# Patient Record
Sex: FEMALE | Race: WHITE | NOT HISPANIC OR LATINO
[De-identification: names, ages, dates, MRNs, and addresses within clinical notes are randomized per-mention and may not be internally consistent; named-entity substitution may affect disease eponyms.]

---

## 2019-06-20 ENCOUNTER — APPOINTMENT (OUTPATIENT)
Dept: GASTROENTEROLOGY | Facility: CLINIC | Age: 59
End: 2019-06-20
Payer: COMMERCIAL

## 2019-06-20 VITALS
HEIGHT: 67 IN | DIASTOLIC BLOOD PRESSURE: 80 MMHG | HEART RATE: 70 BPM | WEIGHT: 132 LBS | BODY MASS INDEX: 20.72 KG/M2 | OXYGEN SATURATION: 98 % | SYSTOLIC BLOOD PRESSURE: 138 MMHG

## 2019-06-20 DIAGNOSIS — Z78.9 OTHER SPECIFIED HEALTH STATUS: ICD-10-CM

## 2019-06-20 PROCEDURE — 99204 OFFICE O/P NEW MOD 45 MIN: CPT

## 2019-06-20 RX ORDER — TOPIRAMATE 50 MG/1
50 CAPSULE, EXTENDED RELEASE ORAL
Refills: 0 | Status: ACTIVE | COMMUNITY

## 2019-06-20 RX ORDER — DICLOFENAC POTASSIUM 50 MG/1
50 POWDER, FOR SOLUTION ORAL
Refills: 0 | Status: ACTIVE | COMMUNITY

## 2019-06-20 RX ORDER — SUMATRIPTAN 100 MG/1
100 TABLET, FILM COATED ORAL
Refills: 0 | Status: ACTIVE | COMMUNITY

## 2019-06-20 RX ORDER — FREMANEZUMAB-VFRM 225 MG/1.5ML
225 INJECTION SUBCUTANEOUS
Refills: 0 | Status: ACTIVE | COMMUNITY

## 2019-06-20 RX ORDER — METAXALONE 800 MG/1
800 TABLET ORAL
Refills: 0 | Status: ACTIVE | COMMUNITY

## 2019-06-20 NOTE — HISTORY OF PRESENT ILLNESS
[FreeTextEntry1] : 58 yo f migraine HA, Cspine pain, noncancerous breast cysts, s/p tubal ligation, s/p tummy tuck, left hand surgery, bilateral breast bx, h/o recurrent pancreatitis 2009, 2010,  s/p ERCP in [place] 2010, told she had a precancerous that was "removed" during ERCP and told to have continued surveillance with EGD. \par no further episodes of pancreatitis since ERCP in 2010 \par reflux started 2010, was previously on PPI, helpful with symptoms, she has tried to control GERD with diet but it is not helpful. GERD continues to come and go, 2-3 days per week. She is now taking OTC ranitidine, 70 % helpful. symptoms include upper abdominal pain, fullness/bloating, chest discomfort, \par she avoids lactose\par \par since she started Ajovy 2 mos ago, she notes more constipation, she has bm every other day with more difficulty. uses senna tea PRN which is helpful. \par \par no brbpr/melena\par no diarrhea\par weight is stable. \par she has 9-10 year history of intermittent lower abdominal pain, chronic and unchanged for yeas, responds to Bentyl. \par \par colonoscopy at age 49 in 2009- no polyps\par \par last colonoscopy 2014- no polyps\par \par previous GI Dr. Walton\par no records available. \par \par fam hx negative for colon polyps, colon cancer. uncle liver ca

## 2019-06-20 NOTE — ASSESSMENT
[FreeTextEntry1] : obtain previous GI records, will consider abdominal imaging after record review\par Recommend EGD to evaluate chronic GERD and patient reportedly due for colonoscopy. \par Risks (including but not limited to sedation risks, infection, bleeding, perforation, possibility of missed lesions), benefits and alternatives to procedure, including not doing the procedure, were discussed with patient. Patient understood and agreed to proceed with EGD/colonoscopy. \par EGD/Colonoscopy preparation instructions reviewed with patient.\par

## 2019-06-20 NOTE — PHYSICAL EXAM
[General Appearance - Alert] : alert [General Appearance - In No Acute Distress] : in no acute distress [Sclera] : the sclera and conjunctiva were normal [Outer Ear] : the ears and nose were normal in appearance [Neck Appearance] : the appearance of the neck was normal [] : no respiratory distress [Apical Impulse] : the apical impulse was normal [Skin Color & Pigmentation] : normal skin color and pigmentation [No Focal Deficits] : no focal deficits [Oriented To Time, Place, And Person] : oriented to person, place, and time [Abdomen Soft] : soft [Abdomen Tenderness] : non-tender [Abnormal Walk] : normal gait [FreeTextEntry1] : deferred to upcoming colonoscopy

## 2019-06-21 ENCOUNTER — TRANSCRIPTION ENCOUNTER (OUTPATIENT)
Age: 59
End: 2019-06-21

## 2019-08-12 ENCOUNTER — RESULT REVIEW (OUTPATIENT)
Age: 59
End: 2019-08-12

## 2019-08-13 ENCOUNTER — RESULT REVIEW (OUTPATIENT)
Age: 59
End: 2019-08-13

## 2019-08-13 ENCOUNTER — APPOINTMENT (OUTPATIENT)
Dept: GASTROENTEROLOGY | Facility: HOSPITAL | Age: 59
End: 2019-08-13

## 2019-08-13 ENCOUNTER — RX RENEWAL (OUTPATIENT)
Age: 59
End: 2019-08-13

## 2019-08-13 DIAGNOSIS — B37.81 CANDIDAL ESOPHAGITIS: ICD-10-CM

## 2020-02-18 ENCOUNTER — APPOINTMENT (OUTPATIENT)
Dept: GASTROENTEROLOGY | Facility: HOSPITAL | Age: 60
End: 2020-02-18

## 2020-07-14 ENCOUNTER — APPOINTMENT (OUTPATIENT)
Dept: GASTROENTEROLOGY | Facility: CLINIC | Age: 60
End: 2020-07-14
Payer: COMMERCIAL

## 2020-07-14 VITALS
SYSTOLIC BLOOD PRESSURE: 120 MMHG | TEMPERATURE: 98.8 F | HEIGHT: 67 IN | HEART RATE: 72 BPM | WEIGHT: 131 LBS | BODY MASS INDEX: 20.56 KG/M2 | DIASTOLIC BLOOD PRESSURE: 74 MMHG

## 2020-07-14 PROCEDURE — 99213 OFFICE O/P EST LOW 20 MIN: CPT

## 2020-07-14 RX ORDER — DICYCLOMINE HYDROCHLORIDE 10 MG/1
10 CAPSULE ORAL
Refills: 0 | Status: DISCONTINUED | COMMUNITY
End: 2020-07-14

## 2020-07-14 RX ORDER — FLUCONAZOLE 200 MG/1
200 TABLET ORAL
Qty: 15 | Refills: 0 | Status: DISCONTINUED | COMMUNITY
Start: 2019-08-13 | End: 2020-07-14

## 2020-07-14 RX ORDER — DULOXETINE HYDROCHLORIDE 20 MG/1
20 CAPSULE, DELAYED RELEASE ORAL
Refills: 0 | Status: DISCONTINUED | COMMUNITY
End: 2020-07-14

## 2020-07-14 RX ORDER — POLYETHYLENE GLYCOL 3350 AND ELECTROLYTES WITH LEMON FLAVOR 236; 22.74; 6.74; 5.86; 2.97 G/4L; G/4L; G/4L; G/4L; G/4L
236 POWDER, FOR SOLUTION ORAL
Qty: 1 | Refills: 0 | Status: DISCONTINUED | COMMUNITY
Start: 2020-02-13 | End: 2020-07-14

## 2020-07-14 NOTE — HISTORY OF PRESENT ILLNESS
[FreeTextEntry1] : Ms. Key is a pleasant 60F h/o migraines, pancreatic lesion s/p resection, pancreatitis s/p multiple ERCPs [id number], tubal ligation, tummy ofelia, L hand surgery who follows up for her GERD.\par \par She was previously seen by Dr. Law, had an EGD 8/13/19 showing candidal esophagitis, reflux esophagitis.  She was treated with fluconazole and omeprazole at that time.\par \par She states she has had significant reflux since her bouts with pancreatitis approximately 10 years ago, since that time she has been sensitive to many foods, mainly experiencing heartburn.  She had a recurrence of her heartburn within the past few months. During that time was also c/o a hoarse voice, sore throat.\par \par She recently saw Dr. Genao of [facility], had a TNE showing evidence of reflux.  She was started on prilosec 20mg daily with resolution of her symptoms.\par \par Denies dysphagia, odynophagia, N/V, rectal bleeding, black stool. She has lost weight due to decreased caloric intake.\par \par Eats a healthy diet, does occasionally eat dinner later than she should.\par \par Is concerned regarding long term effects of PPI medications.\par \par Of note, had a colonoscopy with Dr. Law 2/19/20 showing hemorrhoids, diverticulosis.

## 2020-07-14 NOTE — ASSESSMENT
[FreeTextEntry1] : No alarm signs or symptoms to necessitate a repeat EGD at this time.\par \par Discussed at length the risks of PPI medications. Discussed a recent double blinded randomized controlled trial published within the past year showing a favorable safety profile of PPI medications, with the one significant risk in median 3 year follow up being increased risk of GI infections.\par \par Discussed the option of referring for anti-reflux surgery, which she would like to avoid.\par \par Will continue with her omeprazole 20mg daily for the time being, will likely continue on this medication long term.  She may opt to try to ween off and transition to an H2 blocker at some point.

## 2020-07-14 NOTE — PHYSICAL EXAM
[General Appearance - Alert] : alert [General Appearance - In No Acute Distress] : in no acute distress [Sclera] : the sclera and conjunctiva were normal [Neck Appearance] : the appearance of the neck was normal [Outer Ear] : the ears and nose were normal in appearance [Abnormal Walk] : normal gait [] : no respiratory distress [Cranial Nerves] : cranial nerves 2-12 were intact [Skin Color & Pigmentation] : normal skin color and pigmentation [Oriented To Time, Place, And Person] : oriented to person, place, and time

## 2020-09-16 ENCOUNTER — TRANSCRIPTION ENCOUNTER (OUTPATIENT)
Age: 60
End: 2020-09-16

## 2021-02-09 ENCOUNTER — NON-APPOINTMENT (OUTPATIENT)
Age: 61
End: 2021-02-09

## 2021-02-09 ENCOUNTER — APPOINTMENT (OUTPATIENT)
Dept: INTERNAL MEDICINE | Facility: CLINIC | Age: 61
End: 2021-02-09
Payer: COMMERCIAL

## 2021-02-09 VITALS
HEART RATE: 72 BPM | OXYGEN SATURATION: 99 % | SYSTOLIC BLOOD PRESSURE: 130 MMHG | HEIGHT: 67 IN | WEIGHT: 131 LBS | DIASTOLIC BLOOD PRESSURE: 70 MMHG | BODY MASS INDEX: 20.56 KG/M2

## 2021-02-09 DIAGNOSIS — Z86.69 PERSONAL HISTORY OF OTHER DISEASES OF THE NERVOUS SYSTEM AND SENSE ORGANS: ICD-10-CM

## 2021-02-09 PROCEDURE — 36415 COLL VENOUS BLD VENIPUNCTURE: CPT

## 2021-02-09 PROCEDURE — 93000 ELECTROCARDIOGRAM COMPLETE: CPT | Mod: 59

## 2021-02-09 PROCEDURE — 99386 PREV VISIT NEW AGE 40-64: CPT | Mod: 25

## 2021-02-09 PROCEDURE — 99072 ADDL SUPL MATRL&STAF TM PHE: CPT

## 2021-02-09 PROCEDURE — G0442 ANNUAL ALCOHOL SCREEN 15 MIN: CPT

## 2021-02-09 PROCEDURE — G0444 DEPRESSION SCREEN ANNUAL: CPT

## 2021-02-19 LAB
25(OH)D3 SERPL-MCNC: 47.2 NG/ML
ALBUMIN MFR SERPL ELPH: 60.7 %
ALBUMIN SERPL ELPH-MCNC: 3.4 G/DL
ALBUMIN SERPL-MCNC: 3.1 G/DL
ALBUMIN/GLOB SERPL: 1.6 RATIO
ALP BLD-CCNC: 54 U/L
ALPHA1 GLOB MFR SERPL ELPH: 6.2 %
ALPHA1 GLOB SERPL ELPH-MCNC: 0.3 G/DL
ALPHA2 GLOB MFR SERPL ELPH: 13.7 %
ALPHA2 GLOB SERPL ELPH-MCNC: 0.7 G/DL
ALT SERPL-CCNC: 21 U/L
ANION GAP SERPL CALC-SCNC: 12 MMOL/L
AST SERPL-CCNC: 32 U/L
B-GLOBULIN MFR SERPL ELPH: 12.6 %
B-GLOBULIN SERPL ELPH-MCNC: 0.6 G/DL
BASOPHILS # BLD AUTO: 0.03 K/UL
BASOPHILS NFR BLD AUTO: 0.6 %
BILIRUB SERPL-MCNC: 0.4 MG/DL
BUN SERPL-MCNC: 15 MG/DL
CALCIUM SERPL-MCNC: 9.6 MG/DL
CHLORIDE SERPL-SCNC: 109 MMOL/L
CHOLEST SERPL-MCNC: 180 MG/DL
CO2 SERPL-SCNC: 22 MMOL/L
CREAT SERPL-MCNC: 0.98 MG/DL
DEPRECATED KAPPA LC FREE/LAMBDA SER: 1.05 RATIO
EOSINOPHIL # BLD AUTO: 0.09 K/UL
EOSINOPHIL NFR BLD AUTO: 1.9 %
ESTIMATED AVERAGE GLUCOSE: 103 MG/DL
GAMMA GLOB FLD ELPH-MCNC: 0.3 G/DL
GAMMA GLOB MFR SERPL ELPH: 6.8 %
GLUCOSE SERPL-MCNC: 85 MG/DL
HBA1C MFR BLD HPLC: 5.2 %
HCT VFR BLD CALC: 51.4 %
HDLC SERPL-MCNC: 74 MG/DL
HGB BLD-MCNC: 15.6 G/DL
IGA SER QL IEP: 43 MG/DL
IGG SER QL IEP: 289 MG/DL
IGM SER QL IEP: 73 MG/DL
IMM GRANULOCYTES NFR BLD AUTO: 0.4 %
INTERPRETATION SERPL IEP-IMP: NORMAL
KAPPA LC CSF-MCNC: 1.46 MG/DL
KAPPA LC SERPL-MCNC: 1.54 MG/DL
LDLC SERPL CALC-MCNC: 96 MG/DL
LYMPHOCYTES # BLD AUTO: 0.76 K/UL
LYMPHOCYTES NFR BLD AUTO: 15.9 %
M PROTEIN SPEC IFE-MCNC: NORMAL
MAN DIFF?: NORMAL
MCHC RBC-ENTMCNC: 30.4 GM/DL
MCHC RBC-ENTMCNC: 30.8 PG
MCV RBC AUTO: 101.6 FL
MONOCYTES # BLD AUTO: 0.29 K/UL
MONOCYTES NFR BLD AUTO: 6.1 %
NEUTROPHILS # BLD AUTO: 3.58 K/UL
NEUTROPHILS NFR BLD AUTO: 75.1 %
NONHDLC SERPL-MCNC: 106 MG/DL
PLATELET # BLD AUTO: 310 K/UL
POTASSIUM SERPL-SCNC: 4.5 MMOL/L
PROT SERPL-MCNC: 5.1 G/DL
RBC # BLD: 5.06 M/UL
RBC # FLD: 14.1 %
SARS-COV-2 IGG SERPL IA-ACNC: 8.84 INDEX
SARS-COV-2 IGG SERPL QL IA: POSITIVE
SODIUM SERPL-SCNC: 142 MMOL/L
T4 SERPL-MCNC: 6.8 UG/DL
TRIGL SERPL-MCNC: 49 MG/DL
TSH SERPL-ACNC: 3.98 UIU/ML
VIT B12 SERPL-MCNC: 261 PG/ML
WBC # FLD AUTO: 4.77 K/UL

## 2021-03-09 ENCOUNTER — RESULT REVIEW (OUTPATIENT)
Age: 61
End: 2021-03-09

## 2021-03-09 ENCOUNTER — APPOINTMENT (OUTPATIENT)
Dept: HEMATOLOGY ONCOLOGY | Facility: CLINIC | Age: 61
End: 2021-03-09
Payer: COMMERCIAL

## 2021-03-09 VITALS
OXYGEN SATURATION: 97 % | WEIGHT: 128 LBS | HEART RATE: 65 BPM | BODY MASS INDEX: 20.09 KG/M2 | TEMPERATURE: 97.8 F | DIASTOLIC BLOOD PRESSURE: 76 MMHG | RESPIRATION RATE: 20 BRPM | SYSTOLIC BLOOD PRESSURE: 130 MMHG | HEIGHT: 67 IN

## 2021-03-09 VITALS
RESPIRATION RATE: 16 BRPM | HEIGHT: 67 IN | OXYGEN SATURATION: 99 % | DIASTOLIC BLOOD PRESSURE: 70 MMHG | TEMPERATURE: 98.8 F | SYSTOLIC BLOOD PRESSURE: 130 MMHG | BODY MASS INDEX: 20.56 KG/M2 | HEART RATE: 72 BPM | WEIGHT: 131 LBS

## 2021-03-09 PROCEDURE — 99072 ADDL SUPL MATRL&STAF TM PHE: CPT

## 2021-03-09 PROCEDURE — 99205 OFFICE O/P NEW HI 60 MIN: CPT

## 2021-03-09 RX ORDER — DULOXETINE HYDROCHLORIDE 40 MG/1
40 CAPSULE, DELAYED RELEASE PELLETS ORAL
Refills: 0 | Status: ACTIVE | COMMUNITY
Start: 2021-03-09

## 2021-03-09 NOTE — REASON FOR VISIT
[Initial Consultation] : an initial consultation for [FreeTextEntry2] : Hypogammaglobulinemia, erythrocytosis

## 2021-03-09 NOTE — CONSULT LETTER
[Dear  ___] : Dear  [unfilled], [Consult Letter:] : I had the pleasure of evaluating your patient, [unfilled]. [Please see my note below.] : Please see my note below. [Consult Closing:] : Thank you very much for allowing me to participate in the care of this patient.  If you have any questions, please do not hesitate to contact me. [Sincerely,] : Sincerely, [FreeTextEntry3] : Rachael Cardenas DO, FACKARTIK, FACP\par Medical Oncology and Hematology\par [facility]\par

## 2021-03-09 NOTE — HISTORY OF PRESENT ILLNESS
[de-identified] : Ms. Key is a 61 year old female who presents to office for initial consultation of hypogammaglobulinemia (IgG 289, IgA 43) and erythrocytosis (hgb 15.6 hct 51.4%).\par She reports being anemic while she was menstruating.  She was first told she had abnormal blood work about 3 years ago, was tested for hemochromatosis about 1 year ago-reports being negative.\par She denies any illness, fevers.\par \par \par Age of Menarche: 13\par Age of Menopause: 56\par OCP/HRT: OCP x 2 years\par \par \par Smoke: never\par ETOH: rare\par Illicit: never\par \par Family History\par Sister (younger) Breast Ca dx age 58, BRCA negative\par Sister (older) Breast Ca dx late 50's (), BRCA negative\par Maternal Uncle Liver Ca dx 70's\par Maternal Aunt Uterine Ca dx 50's\par Mother Breast and Uterine dx age mid 60's/ late 50's\par Brother Prostate Ca dx mid 50's\par Father Basal Cell?\par Nephew NHL dx early 30's\par \par 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

## 2021-03-09 NOTE — ASSESSMENT
[FreeTextEntry1] : #hypogammaglobulinemia\par Reviewed the diagnosis of hypogammaglobulinemia. \par She is not having any upper respiratory/sinopulmonary infections\par Will recheck LISA and immunoglobulins, UPEP with LISA, LUCIANA, IgG subsets, Flow cytometry\par \par #erythrocytosis\par Although hgb not markedly elevated above 16 will do polycythemia work up as this has been persistent. Will check EPO, JAK2, HFE, iron panel\par \par #macrocytosis likely 2/2 B12 deficiency\par will given b12 1000 mcg daily. If no improvement in B12 will give injections\par Will check IF ab, Anti parietal ab and MMA - to r/o perncious anemia\par \par RTC in 1 week to review bloodwork

## 2021-03-16 ENCOUNTER — APPOINTMENT (OUTPATIENT)
Dept: HEMATOLOGY ONCOLOGY | Facility: CLINIC | Age: 61
End: 2021-03-16
Payer: COMMERCIAL

## 2021-03-16 VITALS
SYSTOLIC BLOOD PRESSURE: 126 MMHG | OXYGEN SATURATION: 98 % | BODY MASS INDEX: 21.19 KG/M2 | HEART RATE: 63 BPM | TEMPERATURE: 97.6 F | RESPIRATION RATE: 18 BRPM | HEIGHT: 67 IN | DIASTOLIC BLOOD PRESSURE: 82 MMHG | WEIGHT: 135 LBS

## 2021-03-16 PROCEDURE — 99215 OFFICE O/P EST HI 40 MIN: CPT

## 2021-03-16 PROCEDURE — 99072 ADDL SUPL MATRL&STAF TM PHE: CPT

## 2021-03-16 NOTE — HISTORY OF PRESENT ILLNESS
[de-identified] : Ms. Key is a 61 year old female who presents to office for initial consultation of hypogammaglobulinemia (IgG 289, IgA 43) and erythrocytosis (hgb 15.6 hct 51.4%).\par She reports being anemic while she was menstruating.  She was first told she had abnormal blood work about 3 years ago, was tested for hemochromatosis about 1 year ago-reports being negative.\par She denies any illness, fevers.\par \par \par Age of Menarche: 13\par Age of Menopause: 56\par OCP/HRT: OCP x 2 years\par \par \par Smoke: never\par ETOH: rare\par Illicit: never\par \par Family History\par Sister (younger) Breast Ca dx age 58, BRCA negative\par Sister (older) Breast Ca dx late 50's (), BRCA negative\par Maternal Uncle Liver Ca dx 70's\par Maternal Aunt Uterine Ca dx 50's\par Mother Breast and Uterine dx age mid 60's/ late 50's\par Brother Prostate Ca dx mid 50's\par Father Basal Cell?\par Nephew NHL dx early 30's\par \par 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

## 2021-03-16 NOTE — ASSESSMENT
[FreeTextEntry1] : #hypogammaglobulinemia\par Reviewed the diagnosis of hypogammaglobulinemia. \par She is not having any upper respiratory/sinopulmonary infections\par LISA - no monoclonal band,  \par immunoglobulins - kappa slightly elevated but no monoclonal band \par UPEP with LISA - no monoclonal band\par IgG subsets - low IgG1 and 2 subsets - asymptomatic. No Frequent infections. Will monitor. If she develops infection will give IVIG\par Flow cytometry - normal\par \par #erythrocytosis\par resolved\par work up negative\par \par #macrocytosis likely 2/2 B12 deficiency\par will given b12 1000 mcg daily. If no improvement in B12 will give injections\par no indications of pernicious anemia\par \par #iron deficiency anemia\par ferritin 31 \par she has constipation. Will give injectafer 750 mg IV x 2 doses 1 week apart to avoid GI side effects. Reviewed side effects\par \par RTC in 2 months with cbc with diff, cmp, immunoglobulins, B12/folate, iron panel and ferritin

## 2021-03-16 NOTE — CONSULT LETTER
[Dear  ___] : Dear  [unfilled], [Consult Letter:] : I had the pleasure of evaluating your patient, [unfilled]. [Please see my note below.] : Please see my note below. [Consult Closing:] : Thank you very much for allowing me to participate in the care of this patient.  If you have any questions, please do not hesitate to contact me. [Sincerely,] : Sincerely, [FreeTextEntry3] : Rachael Cardenas DO, FACKARTIK, FACP\par Medical Oncology and Hematology\par [facility]\par

## 2021-03-18 ENCOUNTER — APPOINTMENT (OUTPATIENT)
Dept: OBGYN | Facility: CLINIC | Age: 61
End: 2021-03-18
Payer: COMMERCIAL

## 2021-03-18 VITALS
HEIGHT: 67 IN | BODY MASS INDEX: 21.19 KG/M2 | TEMPERATURE: 97 F | WEIGHT: 135 LBS | DIASTOLIC BLOOD PRESSURE: 80 MMHG | SYSTOLIC BLOOD PRESSURE: 140 MMHG

## 2021-03-18 DIAGNOSIS — Z80.3 FAMILY HISTORY OF MALIGNANT NEOPLASM OF BREAST: ICD-10-CM

## 2021-03-18 DIAGNOSIS — Z80.0 FAMILY HISTORY OF MALIGNANT NEOPLASM OF DIGESTIVE ORGANS: ICD-10-CM

## 2021-03-18 DIAGNOSIS — K86.2 CYST OF PANCREAS: ICD-10-CM

## 2021-03-18 DIAGNOSIS — Z80.42 FAMILY HISTORY OF MALIGNANT NEOPLASM OF PROSTATE: ICD-10-CM

## 2021-03-18 DIAGNOSIS — Z80.41 FAMILY HISTORY OF MALIGNANT NEOPLASM OF OVARY: ICD-10-CM

## 2021-03-18 DIAGNOSIS — Z01.419 ENCOUNTER FOR GYNECOLOGICAL EXAMINATION (GENERAL) (ROUTINE) W/OUT ABNORMAL FINDINGS: ICD-10-CM

## 2021-03-18 PROCEDURE — 99072 ADDL SUPL MATRL&STAF TM PHE: CPT

## 2021-03-18 PROCEDURE — 99386 PREV VISIT NEW AGE 40-64: CPT

## 2021-03-18 RX ORDER — POLYETHYLENE GLYCOL 3350, SODIUM SULFATE, SODIUM CHLORIDE, POTASSIUM CHLORIDE, ASCORBIC ACID, SODIUM ASCORBATE 7.5-2.691G
100 KIT ORAL
Qty: 1 | Refills: 0 | Status: DISCONTINUED | COMMUNITY
Start: 2019-06-20 | End: 2021-03-18

## 2021-03-18 RX ORDER — TOPIRAMATE 25 MG/1
25 CAPSULE, EXTENDED RELEASE ORAL
Refills: 0 | Status: DISCONTINUED | COMMUNITY
End: 2021-03-18

## 2021-03-18 NOTE — PLAN
[FreeTextEntry1] : -Obtain prior GYN records.\par \par -Referred to [facility] breast surgery.\par \par -Referred for genetic counseling.\par \par -Notify provider for any postmenopausal bleeding.\par \par -F/u one year.

## 2021-03-18 NOTE — REVIEW OF SYSTEMS
[Negative] : Heme/Lymph [Fever] : no fever [Chills] : no chills [Dyspnea] : no dyspnea [Cough] : no cough [Chest Pain] : no chest pain [Palpitations] : no palpitations [Constipation] : no constipation [Diarrhea] : diarrhea [Dysuria] : no dysuria [Pelvic pain] : no pelvic pain [Breast Pain] : no breast pain [Breast Lump] : no breast lump [Nipple Changes] : no nipple changes [Nipple Discharge] : no nipple discharge [Skin Lesion on Breast] : no skin lesion on breast [Skin Rash] : no skin rash [Mole Changes] : no mole changes [Arthralgias] : no arthralgias [Hot Flashes] : no hot flashes

## 2021-03-18 NOTE — HISTORY OF PRESENT ILLNESS
[Menarche Age: ____] : age at menarche was [unfilled] [Currently In Menopause] : currently in menopause [Menopause Age: ____] : age at menopause was [unfilled] [FreeTextEntry1] : 60 y/o G0 presents for annual GYN exam.\par \par -, but not sexually active\par \par -History of breast biopsies. Strong family history of ovarian/breast/endometrial ca\par \par -Was followed by breast specialist. (No longer in insurance network).\par \par -Last breast imaging was Nov 2020, and normal.\par \par -Bone density ordered by primary.\par \par -Last colonoscopy 2/18/20\par \par -Last Pap >3 years ago. 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

## 2021-03-18 NOTE — PHYSICAL EXAM
[Appropriately responsive] : appropriately responsive [Alert] : alert [No Acute Distress] : no acute distress [No Lymphadenopathy] : no lymphadenopathy [Regular Rate Rhythm] : regular rate rhythm [No Murmurs] : no murmurs [Clear to Auscultation B/L] : clear to auscultation bilaterally [Soft] : soft [Non-tender] : non-tender [Oriented x3] : oriented x3 [Examination Of The Breasts] : a normal appearance [No Discharge] : no discharge [No Masses] : no breast masses were palpable [Vulvar Atrophy] : vulvar atrophy [Labia Majora] : normal [Labia Minora] : normal [Atrophy] : atrophy [No Bleeding] : There was no active vaginal bleeding [Normal] : normal [Uterine Adnexae] : normal [Tenderness] : nontender [Enlarged ___ wks] : not enlarged

## 2021-03-22 LAB
CYTOLOGY CVX/VAG DOC THIN PREP: ABNORMAL
HPV HIGH+LOW RISK DNA PNL CVX: NOT DETECTED

## 2021-03-23 ENCOUNTER — RESULT REVIEW (OUTPATIENT)
Age: 61
End: 2021-03-23

## 2021-05-18 ENCOUNTER — APPOINTMENT (OUTPATIENT)
Dept: HEMATOLOGY ONCOLOGY | Facility: CLINIC | Age: 61
End: 2021-05-18
Payer: COMMERCIAL

## 2021-05-18 ENCOUNTER — RESULT REVIEW (OUTPATIENT)
Age: 61
End: 2021-05-18

## 2021-05-18 VITALS
OXYGEN SATURATION: 97 % | DIASTOLIC BLOOD PRESSURE: 91 MMHG | TEMPERATURE: 97.6 F | SYSTOLIC BLOOD PRESSURE: 136 MMHG | RESPIRATION RATE: 16 BRPM | HEART RATE: 74 BPM | WEIGHT: 134 LBS | HEIGHT: 68.4 IN | BODY MASS INDEX: 20.08 KG/M2

## 2021-05-18 PROCEDURE — 99213 OFFICE O/P EST LOW 20 MIN: CPT

## 2021-05-18 PROCEDURE — 99072 ADDL SUPL MATRL&STAF TM PHE: CPT

## 2021-05-18 RX ORDER — ONDANSETRON 4 MG/1
4 TABLET, ORALLY DISINTEGRATING ORAL
Qty: 30 | Refills: 0 | Status: ACTIVE | COMMUNITY
Start: 2021-02-16

## 2021-05-18 RX ORDER — RIMEGEPANT SULFATE 75 MG/75MG
75 TABLET, ORALLY DISINTEGRATING ORAL
Qty: 8 | Refills: 0 | Status: ACTIVE | COMMUNITY
Start: 2021-03-17

## 2021-05-18 NOTE — HISTORY OF PRESENT ILLNESS
[de-identified] : Ms. Key is a 61 year old female who presents to office for initial consultation of hypogammaglobulinemia (IgG 289, IgA 43) and erythrocytosis (hgb 15.6 hct 51.4%).\par She reports being anemic while she was menstruating.  She was first told she had abnormal blood work about 3 years ago, was tested for hemochromatosis about 1 year ago-reports being negative.\par She denies any illness, fevers.\par \par \par Age of Menarche: 13\par Age of Menopause: 56\par OCP/HRT: OCP x 2 years\par \par \par Smoke: never\par ETOH: rare\par Illicit: never\par \par Family History\par Sister (younger) Breast Ca dx age 58, BRCA negative\par Sister (older) Breast Ca dx late 50's (), BRCA negative\par Maternal Uncle Liver Ca dx 70's\par Maternal Aunt Uterine Ca dx 50's\par Mother Breast and Uterine dx age mid 60's/ late 50's\par Brother Prostate Ca dx mid 50's\par Father Basal Cell?\par Nephew NHL dx early 30's\par \par 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 Buchanan in [place].

## 2021-05-18 NOTE — REVIEW OF SYSTEMS
[Constipation] : constipation [Negative] : Allergic/Immunologic [Fatigue] : fatigue [FreeTextEntry7] : chronic

## 2021-05-18 NOTE — ASSESSMENT
[FreeTextEntry1] : #hypogammaglobulinemia\par Reviewed the diagnosis of hypogammaglobulinemia. \par She is not having any upper respiratory/sinopulmonary infections\par LISA - no monoclonal band,  \par immunoglobulins - kappa slightly elevated but no monoclonal band \par UPEP with LISA - no monoclonal band\par IgG subsets - low IgG1 and 2 subsets - asymptomatic. No Frequent infections. Will monitor. If she develops infection will give IVIG\par Flow cytometry - normal\par \par #erythrocytosis\par resolved\par work up negative\par \par #macrocytosis likely 2/2 B12 deficiency\par will given b12 1000 mcg daily. If no improvement in B12 will give injections\par no indications of pernicious anemia\par \par #iron deficiency anemia- ferritin 31 \par she has constipation. Will give injectafer 750 mg IV x 2 doses 1 week apart to avoid GI side effects. Reviewed side effects\par \par Case and management discussed with Dr. Cardenas\par pending repeat blood work\par RTC in 3 months with cbc with diff, cmp, immunoglobulins, B12/folate, iron panel and ferritin

## 2021-06-01 ENCOUNTER — APPOINTMENT (OUTPATIENT)
Dept: ENDOCRINOLOGY | Facility: CLINIC | Age: 61
End: 2021-06-01
Payer: COMMERCIAL

## 2021-06-01 VITALS
BODY MASS INDEX: 20.46 KG/M2 | OXYGEN SATURATION: 97 % | WEIGHT: 135 LBS | RESPIRATION RATE: 16 BRPM | DIASTOLIC BLOOD PRESSURE: 78 MMHG | HEART RATE: 67 BPM | HEIGHT: 68 IN | SYSTOLIC BLOOD PRESSURE: 130 MMHG

## 2021-06-01 PROCEDURE — 36415 COLL VENOUS BLD VENIPUNCTURE: CPT

## 2021-06-01 PROCEDURE — 99204 OFFICE O/P NEW MOD 45 MIN: CPT | Mod: 25

## 2021-06-01 RX ORDER — CHLORHEXIDINE GLUCONATE 4 %
400 (240 MG) LIQUID (ML) TOPICAL
Refills: 0 | Status: DISCONTINUED | COMMUNITY
Start: 2021-03-09 | End: 2021-06-01

## 2021-06-01 NOTE — PHYSICAL EXAM
[No Neck Mass] : no neck mass was observed [de-identified] : mildly enlarged thyroid on exam mobile with swallowing painless

## 2021-06-01 NOTE — REVIEW OF SYSTEMS
[Blurred Vision] : blurred vision [Constipation] : constipation [Abdominal Pain] : abdominal pain [Myalgia] : myalgia  [Muscle Cramps] : muscle cramps [Headaches] : headaches [Cold Intolerance] : cold intolerance [Negative] : Heme/Lymph [FreeTextEntry9] : bone pain [de-identified] : anemia h/o

## 2021-06-01 NOTE — REASON FOR VISIT
[Initial Evaluation] : an initial evaluation [Thyroid nodule/ MNG] : thyroid nodule/ MNG [FreeTextEntry2] : thyroid

## 2021-06-01 NOTE — HISTORY OF PRESENT ILLNESS
[FreeTextEntry1] : Ms. MARIBEL BERGMAN is a 61 year old female sent in a consult by her PCP for newly diagnosed thyroid nodules after having MRI C spine for neck pains with her neurologist \par she brought in a copy of her US reviewed \par \par denies FHx thyroid cancer, mother and sister breast cancer, maternal sister ovarian cancer\par maternal aunt liver cancer\par denies h/o neck irradiation\par denies dysphagia\par denies dyspnea\par denies dysphonia\par \par TSH 3.8 2/2021

## 2021-06-01 NOTE — CONSULT LETTER
[Dear  ___] : Dear  [unfilled], [Consult Letter:] : I had the pleasure of evaluating your patient, [unfilled]. [Please see my note below.] : Please see my note below. [Consult Closing:] : Thank you very much for allowing me to participate in the care of this patient.  If you have any questions, please do not hesitate to contact me. [Sincerely,] : Sincerely, [FreeTextEntry3] : Sincerely,\par \par JANNET BARRETT MD\par [facility]\par [facility]\par

## 2021-06-09 ENCOUNTER — NON-APPOINTMENT (OUTPATIENT)
Age: 61
End: 2021-06-09

## 2021-06-17 ENCOUNTER — NON-APPOINTMENT (OUTPATIENT)
Age: 61
End: 2021-06-17

## 2021-06-17 ENCOUNTER — APPOINTMENT (OUTPATIENT)
Dept: INTERNAL MEDICINE | Facility: CLINIC | Age: 61
End: 2021-06-17
Payer: COMMERCIAL

## 2021-06-17 VITALS
SYSTOLIC BLOOD PRESSURE: 130 MMHG | WEIGHT: 135 LBS | DIASTOLIC BLOOD PRESSURE: 60 MMHG | BODY MASS INDEX: 20.46 KG/M2 | HEART RATE: 72 BPM | HEIGHT: 68 IN | OXYGEN SATURATION: 100 %

## 2021-06-17 PROCEDURE — 99214 OFFICE O/P EST MOD 30 MIN: CPT

## 2021-06-29 ENCOUNTER — APPOINTMENT (OUTPATIENT)
Dept: GASTROENTEROLOGY | Facility: CLINIC | Age: 61
End: 2021-06-29
Payer: COMMERCIAL

## 2021-06-29 ENCOUNTER — NON-APPOINTMENT (OUTPATIENT)
Age: 61
End: 2021-06-29

## 2021-06-29 VITALS
DIASTOLIC BLOOD PRESSURE: 80 MMHG | SYSTOLIC BLOOD PRESSURE: 120 MMHG | WEIGHT: 130 LBS | BODY MASS INDEX: 19.7 KG/M2 | HEART RATE: 78 BPM | TEMPERATURE: 97.8 F | OXYGEN SATURATION: 97 % | HEIGHT: 68 IN

## 2021-06-29 DIAGNOSIS — K21.9 GASTRO-ESOPHAGEAL REFLUX DISEASE W/OUT ESOPHAGITIS: ICD-10-CM

## 2021-06-29 DIAGNOSIS — K52.9 NONINFECTIVE GASTROENTERITIS AND COLITIS, UNSPECIFIED: ICD-10-CM

## 2021-06-29 PROCEDURE — 99214 OFFICE O/P EST MOD 30 MIN: CPT

## 2021-06-29 NOTE — HISTORY OF PRESENT ILLNESS
[FreeTextEntry1] : Ms. Key is a pleasant 61F h/o migraines, pancreatic lesion s/p resection, pancreatitis s/p multiple ERCPs [id number], tubal ligation, tummy tuck, L hand surgery who follows up today.\par \par She was recently seen in the ED on 6/15/21 for LLQ abdominal pain that persistently worsened throughout that day, localized, sharp, severe at times, constant.  Not exacerbated or alleviated by anything she could define.  In the ED on that date:\par WBC 8.8\par H/H 14.0/41.8\par Plat 245\par CMET unremarkable\par CT A/P: Mild mucosal/wall thickening of the small bowel and distal colon with mild periserosal infiltration is most suggestive of enterocolitis\par \par She was prescribed levaquin which she took until 6/17, at which time she changed to flagyl.  Her symptoms significantly improved, however she still has soreness in her LLQ, although no overt pain.  No diarrhea, constipation, rectal bleeding, black stool.  Has had a loss of appetite and mild bloating/gas.  Generally feeling better.\par \par Last colonoscopy 2/20 showing hemorrhoids, diverticulosis.\par \par Of note, reflux symptoms resolved and she has stopped her PPI.

## 2021-06-29 NOTE — ASSESSMENT
[FreeTextEntry1] : Likely a self-limited enterocolitis, now improving.\par \par Continue with expectant management.  Now s/p levaquin and flagyl.\par \par If symptoms recur, or if pain does not resolve within the next several weeks, would then plan on a repeat colonoscopy.

## 2021-08-10 ENCOUNTER — APPOINTMENT (OUTPATIENT)
Dept: SURGERY | Facility: CLINIC | Age: 61
End: 2021-08-10
Payer: COMMERCIAL

## 2021-08-10 ENCOUNTER — LABORATORY RESULT (OUTPATIENT)
Age: 61
End: 2021-08-10

## 2021-08-10 ENCOUNTER — RESULT REVIEW (OUTPATIENT)
Age: 61
End: 2021-08-10

## 2021-08-10 ENCOUNTER — APPOINTMENT (OUTPATIENT)
Dept: HEMATOLOGY ONCOLOGY | Facility: CLINIC | Age: 61
End: 2021-08-10
Payer: COMMERCIAL

## 2021-08-10 VITALS
SYSTOLIC BLOOD PRESSURE: 144 MMHG | DIASTOLIC BLOOD PRESSURE: 89 MMHG | WEIGHT: 130 LBS | HEIGHT: 67 IN | HEART RATE: 65 BPM | BODY MASS INDEX: 20.4 KG/M2

## 2021-08-10 VITALS
TEMPERATURE: 97.5 F | RESPIRATION RATE: 16 BRPM | HEIGHT: 69.6 IN | OXYGEN SATURATION: 98 % | HEART RATE: 70 BPM | DIASTOLIC BLOOD PRESSURE: 78 MMHG | SYSTOLIC BLOOD PRESSURE: 125 MMHG | WEIGHT: 136.6 LBS | BODY MASS INDEX: 19.78 KG/M2

## 2021-08-10 PROCEDURE — 99213 OFFICE O/P EST LOW 20 MIN: CPT

## 2021-08-10 PROCEDURE — 10006 FNA BX W/US GDN EA ADDL: CPT

## 2021-08-10 PROCEDURE — 10005 FNA BX W/US GDN 1ST LES: CPT

## 2021-08-10 PROCEDURE — 99204 OFFICE O/P NEW MOD 45 MIN: CPT

## 2021-08-10 RX ORDER — DOXYCYCLINE 100 MG/1
100 TABLET, FILM COATED ORAL
Qty: 14 | Refills: 0 | Status: COMPLETED | COMMUNITY
Start: 2021-06-17 | End: 2021-08-10

## 2021-08-10 NOTE — HISTORY OF PRESENT ILLNESS
[de-identified] : Ms. Key is a 61 year old female who presents to office for initial consultation of hypogammaglobulinemia (IgG 289, IgA 43) and erythrocytosis (hgb 15.6 hct 51.4%).\par She reports being anemic while she was menstruating.  She was first told she had abnormal blood work about 3 years ago, was tested for hemochromatosis about 1 year ago-reports being negative.\par She denies any illness, fevers.\par \par \par Age of Menarche: 13\par Age of Menopause: 56\par OCP/HRT: OCP x 2 years\par \par \par Smoke: never\par ETOH: rare\par Illicit: never\par \par Family History\par Sister (younger) Breast Ca dx age 58, BRCA negative\par Sister (older) Breast Ca dx late 50's (), BRCA negative\par Maternal Uncle Liver Ca dx 70's\par Maternal Aunt Uterine Ca dx 50's\par Mother Breast and Uterine dx age mid 60's/ late 50's\par Brother Prostate Ca dx mid 50's\par Father Basal Cell?\par Nephew NHL dx early 30's\par \par 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 \par She reports being more busy with daily activities since. \par Denies extreme tiredness, however.\par Reports intermittent constipation. She is being mindful of diet and activity to promote peristalsis.\par Reports ongoing issues with intraocular hypertension - denies glaucoma.

## 2021-08-10 NOTE — REVIEW OF SYSTEMS
[Fatigue] : fatigue [Constipation] : constipation [Negative] : Allergic/Immunologic [FreeTextEntry7] : chronic

## 2021-08-10 NOTE — ASSESSMENT
[FreeTextEntry1] : #hypogammaglobulinemia\par Reviewed the diagnosis of hypogammaglobulinemia\par LISA - no monoclonal band,  \par immunoglobulins - kappa slightly elevated but no monoclonal band \par UPEP with LISA - no monoclonal band\par IgG subsets - low IgG1 and 2 subsets - asymptomatic. No Frequent infections. Will monitor. If she develops infection will give IVIG\par Flow cytometry - normal\par 8/10/2021 - pendiing repeat but work up from may stable. no sinopulmonary infections\par \par \par #macrocytosis likely 2/2 B12 deficiency - improved\par \par #iron deficiency anemia- ferritin 31 \par she has constipation. s/p injectafer 750 mg IV x 2 doses \par ferritin recheck was 410\par pending iron and ferritin today although doubt she will need further supplementation as she denies bleeding\par \par \par RTC in 6 months with cbc with diff, cmp, immunoglobulins, B12/folate, iron panel and ferritin

## 2021-08-11 NOTE — CONSULT LETTER
[Dear  ___] : Dear  [unfilled], [Consult Letter:] : I had the pleasure of evaluating your patient, [unfilled]. [Please see my note below.] : Please see my note below. [Consult Closing:] : Thank you very much for allowing me to participate in the care of this patient.  If you have any questions, please do not hesitate to contact me. [Sincerely,] : Sincerely, [FreeTextEntry2] : Dr. Dary Dupont, Dr. Rizwana Segura [FreeTextEntry3] : John Smith MD, FACS\par System Director, Endocrine Surgery\par [facility]\par Assistant Clinical Professor of Surgery\par [facility] [employer] [facility]  [DrRodney  ___] : Dr. HERNADEZ

## 2021-08-11 NOTE — PHYSICAL EXAM
[de-identified] : 1.2 cm left thyroid nodule, well circumscribed and mobile [Laryngoscopy Performed] : laryngoscopy was performed, see procedure section for findings [Midline] : located in midline position [Normal] : orientation to person, place, and time: normal [de-identified] : indirect  laryngoscopy shows normal vocal cord mobility bilaterally with no lesions noted

## 2021-08-11 NOTE — HISTORY OF PRESENT ILLNESS
[de-identified] : Pt c/o Thyroid nodules.  denies dysphagia, hoarseness, SOB or RT exposure\par sonogram:  Right 1.2 cm,  8 mm,  1.0 cm, Left 1.0 cm, 8 mm, 1.5 cm, 1.1 cm thyroid nodules\par normal TFTs\par I have reviewed all old and new data and available images.\par

## 2021-08-11 NOTE — REASON FOR VISIT
[Initial Consultation] : an initial consultation for [FreeTextEntry2] : Thyroid nodules [Family Member] : family member

## 2021-08-11 NOTE — ASSESSMENT
[FreeTextEntry1] : sonogram guided fine needle aspiration cytology bilateral thyroid nodules performed. to call next week for results. patient has been given the opportunity to ask questions, and all of the patient's questions have been answered to their satisfaction\par

## 2021-08-17 ENCOUNTER — NON-APPOINTMENT (OUTPATIENT)
Age: 61
End: 2021-08-17

## 2021-09-21 ENCOUNTER — APPOINTMENT (OUTPATIENT)
Dept: ENDOCRINOLOGY | Facility: CLINIC | Age: 61
End: 2021-09-21
Payer: COMMERCIAL

## 2021-09-21 VITALS
BODY MASS INDEX: 20.88 KG/M2 | WEIGHT: 133 LBS | TEMPERATURE: 96.3 F | RESPIRATION RATE: 18 BRPM | DIASTOLIC BLOOD PRESSURE: 76 MMHG | OXYGEN SATURATION: 98 % | HEIGHT: 67 IN | SYSTOLIC BLOOD PRESSURE: 124 MMHG | HEART RATE: 74 BPM

## 2021-09-21 LAB
T4 FREE SERPL-MCNC: 1.1 NG/DL
THYROGLOB AB SERPL-ACNC: <20 IU/ML
THYROPEROXIDASE AB SERPL IA-ACNC: <10 IU/ML
TSH SERPL-ACNC: 4.3 UIU/ML

## 2021-09-21 PROCEDURE — 99215 OFFICE O/P EST HI 40 MIN: CPT | Mod: 25

## 2021-09-21 PROCEDURE — 36415 COLL VENOUS BLD VENIPUNCTURE: CPT

## 2021-09-21 RX ORDER — OMEPRAZOLE 20 MG/1
20 CAPSULE, DELAYED RELEASE ORAL
Qty: 90 | Refills: 1 | Status: DISCONTINUED | COMMUNITY
Start: 2020-07-14 | End: 2021-09-21

## 2021-09-21 RX ORDER — METRONIDAZOLE 500 MG/1
500 TABLET ORAL 3 TIMES DAILY
Qty: 21 | Refills: 0 | Status: DISCONTINUED | COMMUNITY
Start: 2021-06-17 | End: 2021-09-21

## 2021-09-21 RX ORDER — GABAPENTIN 100 MG/1
100 CAPSULE ORAL
Qty: 90 | Refills: 0 | Status: DISCONTINUED | COMMUNITY
Start: 2021-03-03 | End: 2021-09-21

## 2021-09-21 RX ORDER — LEVOFLOXACIN 500 MG/1
500 TABLET, FILM COATED ORAL
Qty: 5 | Refills: 0 | Status: DISCONTINUED | COMMUNITY
Start: 2021-06-15 | End: 2021-09-21

## 2021-09-24 LAB
24R-OH-CALCIDIOL SERPL-MCNC: 40.2 PG/ML
25(OH)D3 SERPL-MCNC: 44.3 NG/ML
CALCIUM SERPL-MCNC: 9.3 MG/DL
MAGNESIUM SERPL-MCNC: 2.2 MG/DL
PARATHYROID HORMONE INTACT: 42 PG/ML
T4 FREE SERPL-MCNC: 1 NG/DL
TSH SERPL-ACNC: 4.45 UIU/ML

## 2021-09-24 NOTE — HISTORY OF PRESENT ILLNESS
[FreeTextEntry1] : Ms. MARIBEL BERGMAN is a 61 year old female sent in a consult by her PCP for newly diagnosed thyroid nodules after having MRI C spine for neck pains with her neurologist \par she brought in a copy of her US reviewed \par \par denies FHx thyroid cancer, mother and sister breast cancer, maternal sister ovarian cancer\par maternal aunt liver cancer\par denies h/o neck irradiation\par denies dysphagia\par denies dyspnea\par denies dysphonia\par \par TSH 3.8 2/2021
no

## 2021-09-24 NOTE — PHYSICAL EXAM
[No Neck Mass] : no neck mass was observed [de-identified] : mildly enlarged thyroid on exam mobile with swallowing painless

## 2021-09-24 NOTE — REVIEW OF SYSTEMS
[Blurred Vision] : blurred vision [Constipation] : constipation [Abdominal Pain] : abdominal pain [Myalgia] : myalgia  [Muscle Cramps] : muscle cramps [Headaches] : headaches [Cold Intolerance] : cold intolerance [Negative] : Heme/Lymph [FreeTextEntry9] : bone pain [de-identified] : anemia h/o

## 2021-09-24 NOTE — PHYSICAL EXAM
[No Neck Mass] : no neck mass was observed [de-identified] : mildly enlarged thyroid on exam mobile with swallowing painless

## 2021-09-24 NOTE — REVIEW OF SYSTEMS
[Blurred Vision] : blurred vision [Constipation] : constipation [Abdominal Pain] : abdominal pain [Myalgia] : myalgia  [Muscle Cramps] : muscle cramps [Headaches] : headaches [Cold Intolerance] : cold intolerance [Negative] : Heme/Lymph [FreeTextEntry9] : bone pain [de-identified] : anemia h/o

## 2021-09-28 LAB
ALBUMIN SERPL ELPH-MCNC: 3.4 G/DL
ALP BLD-CCNC: 57 U/L
ALT SERPL-CCNC: 25 U/L
ANION GAP SERPL CALC-SCNC: 8 MMOL/L
AST SERPL-CCNC: 39 U/L
BILIRUB SERPL-MCNC: 0.1 MG/DL
BUN SERPL-MCNC: 15 MG/DL
CALCIUM SERPL-MCNC: 9.3 MG/DL
CHLORIDE SERPL-SCNC: 109 MMOL/L
CO2 SERPL-SCNC: 26 MMOL/L
CREAT SERPL-MCNC: 0.79 MG/DL
GLUCOSE SERPL-MCNC: 87 MG/DL
POTASSIUM SERPL-SCNC: 4.7 MMOL/L
PROT SERPL-MCNC: 4.9 G/DL
SODIUM SERPL-SCNC: 143 MMOL/L

## 2021-10-11 ENCOUNTER — RESULT REVIEW (OUTPATIENT)
Age: 61
End: 2021-10-11

## 2021-10-11 ENCOUNTER — APPOINTMENT (OUTPATIENT)
Dept: HEMATOLOGY ONCOLOGY | Facility: CLINIC | Age: 61
End: 2021-10-11
Payer: COMMERCIAL

## 2021-10-11 VITALS
SYSTOLIC BLOOD PRESSURE: 123 MMHG | HEIGHT: 67 IN | RESPIRATION RATE: 16 BRPM | HEART RATE: 66 BPM | DIASTOLIC BLOOD PRESSURE: 77 MMHG | BODY MASS INDEX: 21.12 KG/M2 | OXYGEN SATURATION: 98 % | TEMPERATURE: 97.3 F | WEIGHT: 134.6 LBS

## 2021-10-11 PROCEDURE — 99214 OFFICE O/P EST MOD 30 MIN: CPT

## 2021-10-11 NOTE — HISTORY OF PRESENT ILLNESS
[de-identified] : Ms. Key is a 61 year old female who presents to office for initial consultation of hypogammaglobulinemia (IgG 289, IgA 43) and erythrocytosis (hgb 15.6 hct 51.4%).\par She reports being anemic while she was menstruating.  She was first told she had abnormal blood work about 3 years ago, was tested for hemochromatosis about 1 year ago-reports being negative.\par She denies any illness, fevers.\par \par \par Age of Menarche: 13\par Age of Menopause: 56\par OCP/HRT: OCP x 2 years\par \par \par Smoke: never\par ETOH: rare\par Illicit: never\par \par Family History\par Sister (younger) Breast Ca dx age 58, BRCA negative\par Sister (older) Breast Ca dx late 50's (), BRCA negative\par Maternal Uncle Liver Ca dx 70's\par Maternal Aunt Uterine Ca dx 50's\par Mother Breast and Uterine dx age mid 60's/ late 50's\par Brother Prostate Ca dx mid 50's\par Father Basal Cell?\par Nephew NHL dx early 30's\par \par 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

## 2021-10-11 NOTE — ASSESSMENT
[FreeTextEntry1] : #hypogammaglobulinemia\par Reviewed the diagnosis of hypogammaglobulinemia\par LISA - no monoclonal band,  \par immunoglobulins - kappa slightly elevated but no monoclonal band \par UPEP with LISA - no monoclonal band\par IgG subsets - low IgG1 and 2 subsets - asymptomatic. No Frequent infections. Will monitor. If she develops infection will give IVIG\par Flow cytometry - normal\par 8/10/2021 - remains hypogamma. no sinopulmonary infections\par \par #macrocytosis likely 2/2 B12 deficiency - improved\par \par #iron deficiency anemia- \par she has constipation. s/p injectafer 750 mg IV x 2 doses w\par ferritin recheck was 410\par ferritin 236\par iron and ferritin pending\par \par #thyroid nodules with hypothyroidism\par initial biopsy with Dr. Smith inconclusive\par taking synthroid 0.25 mcg.\par recommended biopsy with Dr. Cedeno \par Dr. Dupont monitoring reviewe dnote from 9/21/21\par \par #osteopenia - continue ca++ and vit D\par \par RTC in 6 months with cbc with diff, cmp, immunoglobulins, B12/folate, iron panel and ferritin

## 2021-11-14 ENCOUNTER — HOSPITAL ENCOUNTER (EMERGENCY)
Dept: HOSPITAL 74 - FER | Age: 61
Discharge: HOME | End: 2021-11-14
Payer: COMMERCIAL

## 2021-11-14 VITALS — BODY MASS INDEX: 20.3 KG/M2

## 2021-11-14 VITALS — SYSTOLIC BLOOD PRESSURE: 145 MMHG | TEMPERATURE: 98.6 F | HEART RATE: 56 BPM | DIASTOLIC BLOOD PRESSURE: 88 MMHG

## 2021-11-14 DIAGNOSIS — S32.592A: Primary | ICD-10-CM
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
## 2021-11-22 ENCOUNTER — TRANSCRIPTION ENCOUNTER (OUTPATIENT)
Age: 61
End: 2021-11-22

## 2022-01-13 DIAGNOSIS — N39.0 URINARY TRACT INFECTION, SITE NOT SPECIFIED: ICD-10-CM

## 2022-02-01 ENCOUNTER — APPOINTMENT (OUTPATIENT)
Dept: ENDOCRINOLOGY | Facility: CLINIC | Age: 62
End: 2022-02-01
Payer: COMMERCIAL

## 2022-02-01 VITALS
WEIGHT: 135 LBS | BODY MASS INDEX: 21.19 KG/M2 | HEART RATE: 71 BPM | DIASTOLIC BLOOD PRESSURE: 74 MMHG | HEIGHT: 67 IN | SYSTOLIC BLOOD PRESSURE: 118 MMHG | RESPIRATION RATE: 99 BRPM

## 2022-02-01 LAB
25(OH)D3 SERPL-MCNC: 35.1 NG/ML
ALBUMIN SERPL ELPH-MCNC: 3.4 G/DL
ALP BLD-CCNC: 65 U/L
ALT SERPL-CCNC: 13 U/L
ANION GAP SERPL CALC-SCNC: 7 MMOL/L
AST SERPL-CCNC: 23 U/L
BILIRUB SERPL-MCNC: 0.4 MG/DL
BUN SERPL-MCNC: 16 MG/DL
CALCIUM SERPL-MCNC: 9.1 MG/DL
CALCIUM SERPL-MCNC: 9.1 MG/DL
CHLORIDE SERPL-SCNC: 109 MMOL/L
CO2 SERPL-SCNC: 27 MMOL/L
CREAT SERPL-MCNC: 0.85 MG/DL
FERRITIN SERPL-MCNC: 159 NG/ML
FOLATE SERPL-MCNC: 13.6 NG/ML
GLUCOSE SERPL-MCNC: 62 MG/DL
IRON SATN MFR SERPL: 33 %
IRON SERPL-MCNC: 91 UG/DL
MAGNESIUM SERPL-MCNC: 2 MG/DL
OSTEOCALCIN SERPL-MCNC: 11.2 NG/ML
PARATHYROID HORMONE INTACT: 46 PG/ML
POTASSIUM SERPL-SCNC: 4 MMOL/L
PROT SERPL-MCNC: 4.6 G/DL
SODIUM SERPL-SCNC: 143 MMOL/L
T4 FREE SERPL-MCNC: 1.2 NG/DL
TIBC SERPL-MCNC: 273 UG/DL
TSH SERPL-ACNC: 2.26 UIU/ML
UIBC SERPL-MCNC: 182 UG/DL
VIT B12 SERPL-MCNC: 1046 PG/ML

## 2022-02-01 PROCEDURE — 99215 OFFICE O/P EST HI 40 MIN: CPT

## 2022-02-01 RX ORDER — ZOLEDRONIC ACID 5 MG/100ML
5 INJECTION, SOLUTION INTRAVENOUS
Qty: 1 | Refills: 0 | Status: ACTIVE | COMMUNITY
Start: 2022-02-01

## 2022-02-01 RX ORDER — NITROFURANTOIN (MONOHYDRATE/MACROCRYSTALS) 25; 75 MG/1; MG/1
100 CAPSULE ORAL
Qty: 10 | Refills: 0 | Status: DISCONTINUED | COMMUNITY
Start: 2022-01-13 | End: 2022-02-01

## 2022-02-01 RX ORDER — NAPROXEN 500 MG/1
500 TABLET ORAL
Qty: 20 | Refills: 0 | Status: ACTIVE | COMMUNITY
Start: 2021-11-17

## 2022-02-01 RX ORDER — COLD-HOT PACK
50 EACH MISCELLANEOUS DAILY
Refills: 0 | Status: ACTIVE | COMMUNITY
Start: 2021-03-09

## 2022-02-01 NOTE — HISTORY OF PRESENT ILLNESS
[FreeTextEntry1] : Ms. MARIBEL BERGMAN is a 61 year old female initially  sent in a consult by her PCP for newly diagnosed thyroid nodules after having MRI C spine for neck pains with her neurologist \par she brought in a copy of her US reviewed \par had nondiagnostic FNA 8/21 , repeated US 1/22 stable nodules reviewed\par recent pelvic Fx\par \par denies FHx thyroid cancer, mother and sister breast cancer, maternal sister ovarian cancer\par maternal aunt liver cancer\par denies h/o neck irradiation\par denies dysphagia\par denies dyspnea\par denies dysphonia\par \par TSH 3.8 2/2021

## 2022-02-01 NOTE — PHYSICAL EXAM
[No Neck Mass] : no neck mass was observed [de-identified] : mildly enlarged thyroid on exam mobile with swallowing painless

## 2022-02-01 NOTE — REVIEW OF SYSTEMS
[Blurred Vision] : blurred vision [Constipation] : constipation [Abdominal Pain] : abdominal pain [Myalgia] : myalgia  [Muscle Cramps] : muscle cramps [Headaches] : headaches [Cold Intolerance] : cold intolerance [Negative] : Heme/Lymph [FreeTextEntry9] : bone pain [de-identified] : anemia h/o

## 2022-02-02 RX ORDER — IBUPROFEN 200 MG
600 CAPSULE ORAL
Qty: 180 | Refills: 0 | Status: ACTIVE | COMMUNITY
Start: 2022-01-01

## 2022-02-03 ENCOUNTER — TRANSCRIPTION ENCOUNTER (OUTPATIENT)
Age: 62
End: 2022-02-03

## 2022-02-08 ENCOUNTER — RESULT REVIEW (OUTPATIENT)
Age: 62
End: 2022-02-08

## 2022-02-08 ENCOUNTER — APPOINTMENT (OUTPATIENT)
Dept: HEMATOLOGY ONCOLOGY | Facility: CLINIC | Age: 62
End: 2022-02-08
Payer: COMMERCIAL

## 2022-02-08 VITALS
HEIGHT: 67 IN | OXYGEN SATURATION: 99 % | HEART RATE: 65 BPM | WEIGHT: 135 LBS | TEMPERATURE: 97 F | DIASTOLIC BLOOD PRESSURE: 88 MMHG | BODY MASS INDEX: 21.19 KG/M2 | RESPIRATION RATE: 18 BRPM | SYSTOLIC BLOOD PRESSURE: 136 MMHG

## 2022-02-08 PROCEDURE — 36415 COLL VENOUS BLD VENIPUNCTURE: CPT

## 2022-02-08 PROCEDURE — 99213 OFFICE O/P EST LOW 20 MIN: CPT | Mod: 25

## 2022-02-08 RX ORDER — DEXAMETHASONE 4 MG/1
4 TABLET ORAL
Qty: 9 | Refills: 0 | Status: COMPLETED | COMMUNITY
Start: 2020-12-03 | End: 2022-02-08

## 2022-02-08 NOTE — REVIEW OF SYSTEMS
[Negative] : Gastrointestinal [Fatigue] : no fatigue [Constipation] : no constipation [FreeTextEntry2] : review of systems completed, negative unless otherwise noted above [FreeTextEntry7] : chronic

## 2022-02-08 NOTE — ASSESSMENT
[FreeTextEntry1] : #hypogammaglobulinemia\par Reviewed the diagnosis of hypogammaglobulinemia\par LISA - no monoclonal band,  \par immunoglobulins - kappa slightly elevated but no monoclonal band \par UPEP with LISA - no monoclonal band\par IgG subsets - low IgG1 and 2 subsets - asymptomatic. No Frequent infections. Will monitor. If she develops infection will give IVIG\par Flow cytometry - normal\par 8/10/2021 - remains hypogamma. no sinopulmonary infections\par 2/8/2022 immunoglobulins pending,IgG continues to downtrend\par \par #macrocytosis likely 2/2 B12 deficiency - improved\par \par #iron deficiency anemia- \par she has constipation. s/p injectafer 750 mg IV x 2 doses w\par ferritin recheck was 410\par ferritin 236\par iron and ferritin pending\par \par #thyroid nodules with hypothyroidism\par initial biopsy with Dr. Smith inconclusive\par taking synthroid 0.25 mcg.\par recommended biopsy with Dr. Cedeno \par Dr. Dupont monitoring \par \par #osteopenia - continue ca++ and vit D\par Dr. Dupont recommending reclast\par \par Case and management discussed with Dr. delgadillo\par RTC in 6 months with cbc with diff, cmp, immunoglobulins, B12/folate, iron panel and ferritin

## 2022-02-08 NOTE — HISTORY OF PRESENT ILLNESS
[de-identified] : Ms. Key is a 61 year old female who presents to office for initial consultation of hypogammaglobulinemia (IgG 289, IgA 43) and erythrocytosis (hgb 15.6 hct 51.4%).\par She reports being anemic while she was menstruating.  She was first told she had abnormal blood work about 3 years ago, was tested for hemochromatosis about 1 year ago-reports being negative.\par She denies any illness, fevers.\par \par \par Age of Menarche: 13\par Age of Menopause: 56\par OCP/HRT: OCP x 2 years\par \par \par Smoke: never\par ETOH: rare\par Illicit: never\par \par Family History\par Sister (younger) Breast Ca dx age 58, BRCA negative\par Sister (older) Breast Ca dx late 50's (), BRCA negative\par Maternal Uncle Liver Ca dx 70's\par Maternal Aunt Uterine Ca dx 50's\par Mother Breast and Uterine dx age mid 60's/ late 50's\par Brother Prostate Ca dx mid 50's\par Father Basal Cell?\par Nephew NHL dx early 30's\par \par 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

## 2022-02-10 ENCOUNTER — RX RENEWAL (OUTPATIENT)
Age: 62
End: 2022-02-10

## 2022-02-22 ENCOUNTER — APPOINTMENT (OUTPATIENT)
Dept: SURGERY | Facility: CLINIC | Age: 62
End: 2022-02-22

## 2022-02-25 ENCOUNTER — RX RENEWAL (OUTPATIENT)
Age: 62
End: 2022-02-25

## 2022-02-27 ENCOUNTER — RESULT REVIEW (OUTPATIENT)
Age: 62
End: 2022-02-27

## 2022-02-28 ENCOUNTER — RESULT REVIEW (OUTPATIENT)
Age: 62
End: 2022-02-28

## 2022-03-01 ENCOUNTER — RESULT REVIEW (OUTPATIENT)
Age: 62
End: 2022-03-01

## 2022-03-07 ENCOUNTER — HOSPITAL ENCOUNTER (EMERGENCY)
Dept: HOSPITAL 74 - FER | Age: 62
Discharge: HOME | End: 2022-03-07
Payer: COMMERCIAL

## 2022-03-07 VITALS — DIASTOLIC BLOOD PRESSURE: 88 MMHG | SYSTOLIC BLOOD PRESSURE: 132 MMHG | HEART RATE: 60 BPM | TEMPERATURE: 97.9 F

## 2022-03-07 VITALS — BODY MASS INDEX: 20.7 KG/M2

## 2022-03-07 DIAGNOSIS — M79.671: Primary | ICD-10-CM

## 2022-04-12 ENCOUNTER — APPOINTMENT (OUTPATIENT)
Dept: HEMATOLOGY ONCOLOGY | Facility: CLINIC | Age: 62
End: 2022-04-12
Payer: COMMERCIAL

## 2022-04-12 ENCOUNTER — RESULT REVIEW (OUTPATIENT)
Age: 62
End: 2022-04-12

## 2022-04-12 VITALS
OXYGEN SATURATION: 99 % | BODY MASS INDEX: 21.66 KG/M2 | RESPIRATION RATE: 18 BRPM | HEART RATE: 69 BPM | TEMPERATURE: 98.2 F | WEIGHT: 138 LBS | SYSTOLIC BLOOD PRESSURE: 143 MMHG | DIASTOLIC BLOOD PRESSURE: 90 MMHG | HEIGHT: 66.97 IN

## 2022-04-12 PROCEDURE — 99214 OFFICE O/P EST MOD 30 MIN: CPT | Mod: 25

## 2022-04-12 PROCEDURE — 36415 COLL VENOUS BLD VENIPUNCTURE: CPT

## 2022-04-12 NOTE — REVIEW OF SYSTEMS
[Negative] : Allergic/Immunologic [Joint Pain] : joint pain [Fatigue] : no fatigue [Constipation] : no constipation [FreeTextEntry2] : review of systems completed, negative unless otherwise noted above [FreeTextEntry7] : chronic [FreeTextEntry9] : recent right foot fracture

## 2022-04-12 NOTE — ASSESSMENT
[FreeTextEntry1] : #hypogammaglobulinemia\par Reviewed the diagnosis of hypogammaglobulinemia\par LISA - no monoclonal band,  \par immunoglobulins - kappa slightly elevated but no monoclonal band \par UPEP with LISA - no monoclonal band\par IgG subsets - low IgG1 and 2 subsets - asymptomatic. No Frequent infections. Will monitor. If she develops infection will give IVIG\par Flow cytometry - normal\par 8/10/2021 - remains hypogamma. no sinopulmonary infections\par 2/8/2022 immunoglobulins pending,IgG continues to downtrend\par 4/12/22 - vs and labs reviewed. had a recent flu. wants to hold IVIG. If develops another sinopulmonary infection recommend dose of IVIG. Pending immunoglobulins\par \par #macrocytosis likely 2/2 B12 deficiency - improved\par \par #iron deficiency anemia- \par she has constipation. s/p injectafer 750 mg IV x 2 doses w\par iron wnl and ferritin pending\par \par #thyroid nodules with hypothyroidism\par initial biopsy with Dr. Smith inconclusive\par taking synthroid 0.25 mcg.\par Dr. Dupont monitoring \par biopsy - benign \par \par #osteopenia - continue ca++ and vit D\par reclast given - tolerated well\par \par RTC in 6 months with cbc with diff, cmp, immunoglobulins, B12/folate, iron panel and ferritin

## 2022-04-12 NOTE — HISTORY OF PRESENT ILLNESS
[de-identified] : Ms. Key is a 61 year old female who presents to office for initial consultation of hypogammaglobulinemia (IgG 289, IgA 43) and erythrocytosis (hgb 15.6 hct 51.4%).\par She reports being anemic while she was menstruating.  She was first told she had abnormal blood work about 3 years ago, was tested for hemochromatosis about 1 year ago-reports being negative.\par She denies any illness, fevers.\par \par \par Age of Menarche: 13\par Age of Menopause: 56\par OCP/HRT: OCP x 2 years\par \par \par Smoke: never\par ETOH: rare\par Illicit: never\par \par Family History\par Sister (younger) Breast Ca dx age 58, BRCA negative\par Sister (older) Breast Ca dx late 50's (), BRCA negative\par Maternal Uncle Liver Ca dx 70's\par Maternal Aunt Uterine Ca dx 50's\par Mother Breast and Uterine dx age mid 60's/ late 50's\par Brother Prostate Ca dx mid 50's\par Father Basal Cell?\par Nephew NHL dx early 30's\par \par 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 Her hip is feeling better from the recent hip fracture but she currently has a right foot fracture. \par She denies feeling dizzy, lightheaded, SOB, palpitations, nausea, vomiting, constipation, diarrhea, bleeding, frequent fevers, and illness.\par Patient had thyroid nodules biopsy which were both benign.

## 2022-05-08 ENCOUNTER — LABORATORY RESULT (OUTPATIENT)
Age: 62
End: 2022-05-08

## 2022-05-10 ENCOUNTER — APPOINTMENT (OUTPATIENT)
Dept: ENDOCRINOLOGY | Facility: CLINIC | Age: 62
End: 2022-05-10
Payer: COMMERCIAL

## 2022-05-10 VITALS
HEIGHT: 67 IN | HEART RATE: 63 BPM | SYSTOLIC BLOOD PRESSURE: 110 MMHG | WEIGHT: 136 LBS | OXYGEN SATURATION: 99 % | BODY MASS INDEX: 21.35 KG/M2 | DIASTOLIC BLOOD PRESSURE: 70 MMHG

## 2022-05-10 LAB
24R-OH-CALCIDIOL SERPL-MCNC: 55.7 PG/ML
25(OH)D3 SERPL-MCNC: 35.1 NG/ML
ALBUMIN SERPL ELPH-MCNC: 3.5 G/DL
ALP BLD-CCNC: 54 U/L
ALT SERPL-CCNC: 18 U/L
ANION GAP SERPL CALC-SCNC: 16 MMOL/L
AST SERPL-CCNC: 25 U/L
BILIRUB SERPL-MCNC: 0.3 MG/DL
BUN SERPL-MCNC: 18 MG/DL
CALCIUM SERPL-MCNC: 9.6 MG/DL
CALCIUM SERPL-MCNC: 9.6 MG/DL
CHLORIDE SERPL-SCNC: 111 MMOL/L
CO2 SERPL-SCNC: 20 MMOL/L
COLLAGEN NTX UR-SCNC: 389 NMOL BCE
COLLAGEN NTX/CREAT UR-SRTO: 48
CREAT SERPL-MCNC: 0.88 MG/DL
CREAT UR-MCNC: 91.3 MG/DL
EGFR: 74 ML/MIN/1.73M2
GLUCOSE SERPL-MCNC: 73 MG/DL
INTERPRETIVE GUIDE:: NORMAL
MAGNESIUM SERPL-MCNC: 2.1 MG/DL
PARATHYROID HORMONE INTACT: 40 PG/ML
PHOSPHATE SERPL-MCNC: 3.7 MG/DL
POTASSIUM SERPL-SCNC: 4.7 MMOL/L
PROT SERPL-MCNC: 5.2 G/DL
SODIUM SERPL-SCNC: 147 MMOL/L
T4 FREE SERPL-MCNC: 1 NG/DL
THYROGLOB AB SERPL-ACNC: <20 IU/ML
THYROPEROXIDASE AB SERPL IA-ACNC: <10 IU/ML
TSH SERPL-ACNC: 2.91 UIU/ML

## 2022-05-10 PROCEDURE — 99215 OFFICE O/P EST HI 40 MIN: CPT

## 2022-06-09 LAB
ALBUMIN MFR SERPL ELPH: 64.4 %
ALBUMIN SERPL-MCNC: 3.3 G/DL
ALBUMIN/GLOB SERPL: 1.7 RATIO
ALPHA1 GLOB MFR SERPL ELPH: 4.9 %
ALPHA1 GLOB SERPL ELPH-MCNC: 0.3 G/DL
ALPHA2 GLOB MFR SERPL ELPH: 12.8 %
ALPHA2 GLOB SERPL ELPH-MCNC: 0.7 G/DL
B-GLOBULIN MFR SERPL ELPH: 11.4 %
B-GLOBULIN SERPL ELPH-MCNC: 0.6 G/DL
COLLAGEN CTX SERPL-MCNC: 87 PG/ML
GAMMA GLOB FLD ELPH-MCNC: 0.3 G/DL
GAMMA GLOB MFR SERPL ELPH: 6.5 %
INTERPRETATION SERPL IEP-IMP: NORMAL
PROT SERPL-MCNC: 5.2 G/DL
PROT SERPL-MCNC: 5.2 G/DL

## 2022-06-20 NOTE — REVIEW OF SYSTEMS
[Blurred Vision] : blurred vision [Constipation] : constipation [Abdominal Pain] : abdominal pain [Myalgia] : myalgia  [Muscle Cramps] : muscle cramps [Headaches] : headaches [Cold Intolerance] : cold intolerance [Negative] : Heme/Lymph [FreeTextEntry9] : bone pain [de-identified] : anemia h/o

## 2022-06-20 NOTE — PHYSICAL EXAM
[No Neck Mass] : no neck mass was observed [de-identified] : mildly enlarged thyroid on exam mobile with swallowing painless

## 2022-08-09 ENCOUNTER — APPOINTMENT (OUTPATIENT)
Dept: HEMATOLOGY ONCOLOGY | Facility: CLINIC | Age: 62
End: 2022-08-09

## 2022-08-23 ENCOUNTER — APPOINTMENT (OUTPATIENT)
Dept: OBGYN | Facility: CLINIC | Age: 62
End: 2022-08-23

## 2022-08-23 ENCOUNTER — NON-APPOINTMENT (OUTPATIENT)
Age: 62
End: 2022-08-23

## 2022-08-23 VITALS
SYSTOLIC BLOOD PRESSURE: 112 MMHG | BODY MASS INDEX: 20.88 KG/M2 | DIASTOLIC BLOOD PRESSURE: 72 MMHG | WEIGHT: 133 LBS | HEIGHT: 67 IN

## 2022-08-23 DIAGNOSIS — Z12.11 ENCOUNTER FOR SCREENING FOR MALIGNANT NEOPLASM OF COLON: ICD-10-CM

## 2022-08-23 DIAGNOSIS — Z98.890 OTHER SPECIFIED POSTPROCEDURAL STATES: ICD-10-CM

## 2022-08-23 DIAGNOSIS — Z87.19 PERSONAL HISTORY OF OTHER DISEASES OF THE DIGESTIVE SYSTEM: ICD-10-CM

## 2022-08-23 PROCEDURE — 99396 PREV VISIT EST AGE 40-64: CPT

## 2022-08-24 PROBLEM — Z87.19 HISTORY OF PANCREATITIS: Status: RESOLVED | Noted: 2019-06-20 | Resolved: 2022-08-24

## 2022-08-24 PROBLEM — Z12.11 COLON CANCER SCREENING: Status: RESOLVED | Noted: 2019-06-20 | Resolved: 2022-08-24

## 2022-08-24 PROBLEM — Z98.890 HISTORY OF PAPANICOLAOU SMEAR: Status: RESOLVED | Noted: 2021-03-18 | Resolved: 2022-08-24

## 2022-08-24 NOTE — PLAN
[FreeTextEntry1] : Referred for genetic counseling due to strong family history of breast cancer. Information provided.

## 2022-08-24 NOTE — HISTORY OF PRESENT ILLNESS
[FreeTextEntry1] : 63 y/o G0 presents for annual GYN exam.\par \par , but not sexually active due to 's disability.\par \par History of breast biopsies. Strong family history of ovarian/breast/endometrial ca.\par \par No PMB.\par \par Numerous health problems this year.\par \par Reports last mammogram/ ultrasound 11/2020- Reports normal.\par \par Last colonoscopy 2/18/20- Recall 7- 10 years\par \par Last pap 3/18/21- NILM/HPV-\par \par Bone Density 3/23/21- Osteopenia\par \par Works as an NP in pain management/headache specialty.

## 2022-08-24 NOTE — PHYSICAL EXAM
[Chaperone Declined] : Patient declined chaperone [Appropriately responsive] : appropriately responsive [Alert] : alert [No Acute Distress] : no acute distress [No Lymphadenopathy] : no lymphadenopathy [Regular Rate Rhythm] : regular rate rhythm [No Murmurs] : no murmurs [Clear to Auscultation B/L] : clear to auscultation bilaterally [Soft] : soft [Non-tender] : non-tender [Non-distended] : non-distended [No HSM] : No HSM [No Lesions] : no lesions [No Mass] : no mass [Oriented x3] : oriented x3 [Examination Of The Breasts] : a normal appearance [No Discharge] : no discharge [No Masses] : no breast masses were palpable [Labia Majora] : normal [Labia Minora] : normal [Atrophy] : atrophy [Normal] : normal [Tenderness] : nontender [Enlarged ___ wks] : not enlarged [Uterine Adnexae] : normal

## 2022-09-24 ENCOUNTER — NON-APPOINTMENT (OUTPATIENT)
Age: 62
End: 2022-09-24

## 2022-09-27 ENCOUNTER — RESULT REVIEW (OUTPATIENT)
Age: 62
End: 2022-09-27

## 2022-09-27 ENCOUNTER — APPOINTMENT (OUTPATIENT)
Dept: ENDOCRINOLOGY | Facility: CLINIC | Age: 62
End: 2022-09-27

## 2022-09-27 ENCOUNTER — APPOINTMENT (OUTPATIENT)
Dept: HEMATOLOGY ONCOLOGY | Facility: CLINIC | Age: 62
End: 2022-09-27

## 2022-09-27 VITALS
TEMPERATURE: 96.7 F | BODY MASS INDEX: 21.5 KG/M2 | OXYGEN SATURATION: 98 % | HEART RATE: 66 BPM | SYSTOLIC BLOOD PRESSURE: 139 MMHG | RESPIRATION RATE: 18 BRPM | HEIGHT: 67 IN | DIASTOLIC BLOOD PRESSURE: 82 MMHG | WEIGHT: 137 LBS

## 2022-09-27 VITALS
OXYGEN SATURATION: 98 % | WEIGHT: 136 LBS | HEIGHT: 67 IN | BODY MASS INDEX: 21.35 KG/M2 | DIASTOLIC BLOOD PRESSURE: 80 MMHG | HEART RATE: 65 BPM | SYSTOLIC BLOOD PRESSURE: 108 MMHG

## 2022-09-27 LAB
25(OH)D3 SERPL-MCNC: 70 NG/ML
ALBUMIN SERPL ELPH-MCNC: 3.7 G/DL
ALP BLD-CCNC: 50 U/L
ALT SERPL-CCNC: 20 U/L
ANION GAP SERPL CALC-SCNC: 10 MMOL/L
AST SERPL-CCNC: 28 U/L
BILIRUB SERPL-MCNC: 0.3 MG/DL
BUN SERPL-MCNC: 19 MG/DL
CALCIUM SERPL-MCNC: 9.7 MG/DL
CALCIUM SERPL-MCNC: 9.7 MG/DL
CHLORIDE SERPL-SCNC: 109 MMOL/L
CO2 SERPL-SCNC: 26 MMOL/L
CREAT SERPL-MCNC: 0.84 MG/DL
EGFR: 78 ML/MIN/1.73M2
GLUCOSE SERPL-MCNC: 84 MG/DL
MAGNESIUM SERPL-MCNC: 2.3 MG/DL
PARATHYROID HORMONE INTACT: 43 PG/ML
POTASSIUM SERPL-SCNC: 4.2 MMOL/L
PROT SERPL-MCNC: 5.3 G/DL
SODIUM SERPL-SCNC: 144 MMOL/L
T4 FREE SERPL-MCNC: 1.2 NG/DL
TSH SERPL-ACNC: 2.61 UIU/ML

## 2022-09-27 PROCEDURE — 99215 OFFICE O/P EST HI 40 MIN: CPT

## 2022-09-27 PROCEDURE — 36415 COLL VENOUS BLD VENIPUNCTURE: CPT

## 2022-09-27 PROCEDURE — 99213 OFFICE O/P EST LOW 20 MIN: CPT | Mod: 25

## 2022-09-27 RX ORDER — FOLIC ACID 0.8 MG
500 TABLET ORAL
Refills: 0 | Status: ACTIVE | COMMUNITY

## 2022-09-27 NOTE — ASSESSMENT
[FreeTextEntry1] : #hypogammaglobulinemia\par Reviewed the diagnosis of hypogammaglobulinemia\par LISA - no monoclonal band,  \par immunoglobulins - kappa slightly elevated but no monoclonal band \par UPEP with LISA - no monoclonal band\par IgG subsets - low IgG1 and 2 subsets - asymptomatic. No Frequent infections. Will monitor. If she develops infection will give IVIG\par Flow cytometry - normal\par 8/10/2021 - remains hypogamma. no sinopulmonary infections\par 2/8/2022 immunoglobulins pending,IgG continues to downtrend\par 4/12/22 - vs and labs reviewed. had a recent flu. wants to hold IVIG. If develops another sinopulmonary infection recommend dose of IVIG. Pending immunoglobulins\par 9/27/2022 immunoglobulin panel pending, without frequent fevers and illnesses\par \par #iron deficiency anemia- \par she has constipation. s/p injectafer 750 mg IV x 2 doses w\par iron wnl and ferritin pending\par 9/27/2022 hgb 15.3, irons pending\par \par #thyroid nodules with hypothyroidism\par initial biopsy with Dr. Smith inconclusive\par taking synthroid 0.25 mcg.\par Dr. Dupont monitoring \par biopsy - benign \par \par #osteopenia - continue ca++ and vit D\par 9/27/2022 per patient, Dr Dupont will administer dose 3/2023\par \par Case and management discussed with Dr. Cardenas\par RTC in 6 months with cbc with diff, cmp, immunoglobulins, B12/folate, iron panel and ferritin

## 2022-09-27 NOTE — HISTORY OF PRESENT ILLNESS
[de-identified] : Ms. Key is a 61 year old female who presents to office for initial consultation of hypogammaglobulinemia (IgG 289, IgA 43) and erythrocytosis (hgb 15.6 hct 51.4%).\par She reports being anemic while she was menstruating.  She was first told she had abnormal blood work about 3 years ago, was tested for hemochromatosis about 1 year ago-reports being negative.\par She denies any illness, fevers.\par \par \par Age of Menarche: 13\par Age of Menopause: 56\par OCP/HRT: OCP x 2 years\par \par \par Smoke: never\par ETOH: rare\par Illicit: never\par \par Family History\par Sister (younger) Breast Ca dx age 58, BRCA negative\par Sister (older) Breast Ca dx late 50's (), BRCA negative\par Maternal Uncle Liver Ca dx 70's\par Maternal Aunt Uterine Ca dx 50's\par Mother Breast and Uterine dx age mid 60's/ late 50's\par Brother Prostate Ca dx mid 50's\par Father Basal Cell?\par Nephew NHL dx early 30's\par \par 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

## 2022-09-27 NOTE — REVIEW OF SYSTEMS
[Fatigue] : no fatigue [Constipation] : no constipation [Joint Pain] : no joint pain [Negative] : Musculoskeletal [FreeTextEntry2] : review of systems completed, negative unless otherwise noted above [FreeTextEntry7] : chronic

## 2022-10-18 ENCOUNTER — NON-APPOINTMENT (OUTPATIENT)
Age: 62
End: 2022-10-18

## 2022-10-18 ENCOUNTER — APPOINTMENT (OUTPATIENT)
Dept: INTERNAL MEDICINE | Facility: CLINIC | Age: 62
End: 2022-10-18

## 2022-10-18 VITALS
SYSTOLIC BLOOD PRESSURE: 158 MMHG | WEIGHT: 138 LBS | RESPIRATION RATE: 16 BRPM | DIASTOLIC BLOOD PRESSURE: 100 MMHG | BODY MASS INDEX: 21.66 KG/M2 | HEART RATE: 72 BPM | OXYGEN SATURATION: 98 % | HEIGHT: 67 IN

## 2022-10-18 DIAGNOSIS — R07.89 OTHER CHEST PAIN: ICD-10-CM

## 2022-10-18 PROCEDURE — 36415 COLL VENOUS BLD VENIPUNCTURE: CPT

## 2022-10-18 PROCEDURE — 99214 OFFICE O/P EST MOD 30 MIN: CPT | Mod: 25

## 2022-10-20 PROBLEM — R07.89 CHEST PAIN, ATYPICAL: Status: ACTIVE | Noted: 2022-10-18

## 2022-10-20 NOTE — HISTORY OF PRESENT ILLNESS
[FreeTextEntry1] : occasional chest pain  [de-identified] : Patient presents to the office today for occasional chest pain.  She has no problem working out or being active and does not feel the chest pain while doing those activities.  However positions does not make it come and go she thinks it was reflux but did not respond to Pepcid.  She is willing to get a full cardiac work-up.  She is not willing to go to the ER.

## 2022-11-13 LAB
COLLAGEN NTX UR-SCNC: 53 NMOL BCE
COLLAGEN NTX/CREAT UR-SRTO: 13
CREAT UR-MCNC: 47.6 MG/DL
INTERPRETIVE GUIDE:: NORMAL

## 2022-11-13 NOTE — REASON FOR VISIT
[Initial Evaluation] : an initial evaluation [Thyroid nodule/ MNG] : thyroid nodule/ MNG [Osteoporosis] : osteoporosis [FreeTextEntry2] : thyroid

## 2022-11-13 NOTE — REVIEW OF SYSTEMS
[Blurred Vision] : blurred vision [Constipation] : constipation [Abdominal Pain] : abdominal pain [Myalgia] : myalgia  [Muscle Cramps] : muscle cramps [Headaches] : headaches [Cold Intolerance] : cold intolerance [Negative] : Heme/Lymph [FreeTextEntry9] : bone pain [de-identified] : anemia h/o

## 2022-11-13 NOTE — HISTORY OF PRESENT ILLNESS
[FreeTextEntry1] : Last Reclat 3/10/22\par Ms. MARIBEL BERGMAN is a 61 year old female initially  sent in a consult by her PCP for newly diagnosed thyroid nodules after having MRI C spine for neck pains with her neurologist  today for f/u \par she brought in a copy of her US reviewed \par had nondiagnostic FNA 8/21 , repeated US 1/22 stable nodules reviewed\par recent pelvic Fx\par \par denies FHx thyroid cancer, mother and sister breast cancer, maternal sister ovarian cancer\par maternal aunt liver cancer\par denies h/o neck irradiation\par denies dysphagia\par denies dyspnea\par denies dysphonia\par \par TSH 3.8 2/2021

## 2022-11-13 NOTE — PHYSICAL EXAM
[No Neck Mass] : no neck mass was observed [de-identified] : mildly enlarged thyroid on exam mobile with swallowing painless

## 2022-12-13 ENCOUNTER — OFFICE (OUTPATIENT)
Dept: URBAN - METROPOLITAN AREA CLINIC 30 | Facility: CLINIC | Age: 62
Setting detail: OPHTHALMOLOGY
End: 2022-12-13
Payer: COMMERCIAL

## 2022-12-13 DIAGNOSIS — H25.093: ICD-10-CM

## 2022-12-13 DIAGNOSIS — H40.053: ICD-10-CM

## 2022-12-13 PROCEDURE — 92133 CPTRZD OPH DX IMG PST SGM ON: CPT | Performed by: OPHTHALMOLOGY

## 2022-12-13 PROCEDURE — 92014 COMPRE OPH EXAM EST PT 1/>: CPT | Performed by: OPHTHALMOLOGY

## 2022-12-13 ASSESSMENT — REFRACTION_CURRENTRX
OS_OVR_VA: 20/
OS_SPHERE: +1.75
OS_SPHERE: -0.50
OD_SPHERE: +0.50
OD_AXIS: 171
OD_AXIS: 171
OD_CYLINDER: +1.75
OS_AXIS: 11
OD_OVR_VA: 20/
OS_CYLINDER: +0.75
OS_CYLINDER: +0.75
OS_AXIS: 16
OS_OVR_VA: 20/
OD_SPHERE: -1.75
OD_OVR_VA: 20/
OD_CYLINDER: +1.25

## 2022-12-13 ASSESSMENT — CONFRONTATIONAL VISUAL FIELD TEST (CVF)
OD_FINDINGS: FULL
OS_FINDINGS: FULL

## 2022-12-13 ASSESSMENT — REFRACTION_MANIFEST
OD_CYLINDER: +2.00
OS_VA1: 20/25-1
OS_AXIS: 09
OS_SPHERE: -0.50
OD_AXIS: 167
OD_SPHERE: +
OD_SPHERE: -1.75
OD_VA1: 20/30-2
OS_CYLINDER: +1.25

## 2022-12-13 ASSESSMENT — REFRACTION_AUTOREFRACTION
OS_SPHERE: -0.50
OS_AXIS: 09
OD_SPHERE: -1.75
OD_AXIS: 167
OD_CYLINDER: +2.00
OS_CYLINDER: +1.25

## 2022-12-13 ASSESSMENT — SPHEQUIV_DERIVED
OS_SPHEQUIV: 0.125
OD_SPHEQUIV: -0.75
OS_SPHEQUIV: 0.125
OD_SPHEQUIV: -0.75

## 2022-12-13 ASSESSMENT — TONOMETRY
OS_IOP_MMHG: 19
OD_IOP_MMHG: 17

## 2022-12-13 ASSESSMENT — VISUAL ACUITY
OS_BCVA: 20/30-2
OD_BCVA: 20/25

## 2023-01-31 ENCOUNTER — RESULT REVIEW (OUTPATIENT)
Age: 63
End: 2023-01-31

## 2023-02-01 DIAGNOSIS — U07.1 COVID-19: ICD-10-CM

## 2023-03-20 DIAGNOSIS — E61.1 IRON DEFICIENCY: ICD-10-CM

## 2023-03-20 DIAGNOSIS — D75.1 SECONDARY POLYCYTHEMIA: ICD-10-CM

## 2023-03-20 DIAGNOSIS — M85.80 OTHER SPECIFIED DISORDERS OF BONE DENSITY AND STRUCTURE, UNSPECIFIED SITE: ICD-10-CM

## 2023-03-28 ENCOUNTER — RESULT REVIEW (OUTPATIENT)
Age: 63
End: 2023-03-28

## 2023-03-28 ENCOUNTER — APPOINTMENT (OUTPATIENT)
Dept: HEMATOLOGY ONCOLOGY | Facility: CLINIC | Age: 63
End: 2023-03-28

## 2023-03-28 VITALS
OXYGEN SATURATION: 98 % | BODY MASS INDEX: 21.03 KG/M2 | TEMPERATURE: 98.5 F | SYSTOLIC BLOOD PRESSURE: 148 MMHG | WEIGHT: 134 LBS | HEIGHT: 67 IN | DIASTOLIC BLOOD PRESSURE: 88 MMHG | RESPIRATION RATE: 16 BRPM | HEART RATE: 67 BPM

## 2023-03-29 ENCOUNTER — APPOINTMENT (OUTPATIENT)
Dept: HEMATOLOGY ONCOLOGY | Facility: CLINIC | Age: 63
End: 2023-03-29

## 2023-07-06 ENCOUNTER — RESULT REVIEW (OUTPATIENT)
Age: 63
End: 2023-07-06

## 2023-07-06 ENCOUNTER — APPOINTMENT (OUTPATIENT)
Dept: HEMATOLOGY ONCOLOGY | Facility: CLINIC | Age: 63
End: 2023-07-06
Payer: COMMERCIAL

## 2023-07-06 VITALS
WEIGHT: 134.4 LBS | TEMPERATURE: 98.2 F | SYSTOLIC BLOOD PRESSURE: 131 MMHG | BODY MASS INDEX: 21.09 KG/M2 | OXYGEN SATURATION: 99 % | DIASTOLIC BLOOD PRESSURE: 82 MMHG | HEART RATE: 66 BPM | RESPIRATION RATE: 16 BRPM | HEIGHT: 67 IN

## 2023-07-06 DIAGNOSIS — E53.8 DEFICIENCY OF OTHER SPECIFIED B GROUP VITAMINS: ICD-10-CM

## 2023-07-06 DIAGNOSIS — R53.83 OTHER FATIGUE: ICD-10-CM

## 2023-07-06 PROCEDURE — 36415 COLL VENOUS BLD VENIPUNCTURE: CPT

## 2023-07-06 PROCEDURE — 99213 OFFICE O/P EST LOW 20 MIN: CPT | Mod: 25

## 2023-07-08 PROBLEM — R53.83 FATIGUE: Status: ACTIVE | Noted: 2022-02-08

## 2023-07-08 PROBLEM — E53.8 B12 DEFICIENCY: Status: ACTIVE | Noted: 2021-03-09

## 2023-07-08 NOTE — REVIEW OF SYSTEMS
[Negative] : Allergic/Immunologic [Fatigue] : no fatigue [Constipation] : no constipation [Joint Pain] : no joint pain [FreeTextEntry2] : review of systems completed, negative unless otherwise noted above [FreeTextEntry7] : chronic

## 2023-07-08 NOTE — ASSESSMENT
[FreeTextEntry1] : #hypogammaglobulinemia\par Reviewed the diagnosis of hypogammaglobulinemia\par LISA - no monoclonal band,  \par immunoglobulins - kappa slightly elevated but no monoclonal band \par UPEP with LISA - no monoclonal band\par IgG subsets - low IgG1 and 2 subsets - asymptomatic. No Frequent infections. Will monitor. If she develops infection will give IVIG\par Flow cytometry - normal\par remains hypogammma, without frequent fevers or sinusitis. \par \par #iron deficiency anemia- \par ferritin 69\par \par #thyroid nodules with hypothyroidism\par initial biopsy with Dr. Smith inconclusive\par taking synthroid 0.25 mcg.\par Dr. Dupont monitoring \par biopsy - benign \par \par #osteopenia - continue ca++ and vit D\par follows with Dr. Dupont\par \par RTC in 6 months with cbc with diff, cmp, immunoglobulins, B12/folate, iron panel and ferritin

## 2023-07-11 ENCOUNTER — OFFICE (OUTPATIENT)
Dept: URBAN - METROPOLITAN AREA CLINIC 30 | Facility: CLINIC | Age: 63
Setting detail: OPHTHALMOLOGY
End: 2023-07-11
Payer: COMMERCIAL

## 2023-07-11 DIAGNOSIS — H40.053: ICD-10-CM

## 2023-07-11 DIAGNOSIS — H25.093: ICD-10-CM

## 2023-07-11 PROCEDURE — 92250 FUNDUS PHOTOGRAPHY W/I&R: CPT | Performed by: OPHTHALMOLOGY

## 2023-07-11 PROCEDURE — 92083 EXTENDED VISUAL FIELD XM: CPT | Performed by: OPHTHALMOLOGY

## 2023-07-11 PROCEDURE — 92014 COMPRE OPH EXAM EST PT 1/>: CPT | Performed by: OPHTHALMOLOGY

## 2023-07-11 ASSESSMENT — REFRACTION_MANIFEST
OS_CYLINDER: +1.25
OD_CYLINDER: +2.00
OD_VA1: 20/30-2
OS_AXIS: 09
OD_SPHERE: +
OS_VA1: 20/25-1
OS_SPHERE: -0.50
OD_AXIS: 167
OD_SPHERE: -1.75

## 2023-07-11 ASSESSMENT — VISUAL ACUITY
OS_BCVA: 20/25-5
OD_BCVA: 20/20

## 2023-07-11 ASSESSMENT — REFRACTION_AUTOREFRACTION
OD_SPHERE: -1.75
OD_CYLINDER: +2.00
OS_SPHERE: -0.50
OS_CYLINDER: +1.25
OS_AXIS: 09
OD_AXIS: 167

## 2023-07-11 ASSESSMENT — REFRACTION_CURRENTRX
OD_SPHERE: +0.50
OS_CYLINDER: +0.75
OS_SPHERE: -0.50
OD_CYLINDER: +1.25
OS_AXIS: 16
OS_SPHERE: +1.75
OS_CYLINDER: +0.75
OS_OVR_VA: 20/
OD_AXIS: 171
OD_AXIS: 171
OS_AXIS: 11
OD_SPHERE: -1.75
OS_OVR_VA: 20/
OD_CYLINDER: +1.75
OD_OVR_VA: 20/
OD_OVR_VA: 20/

## 2023-07-11 ASSESSMENT — TONOMETRY
OS_IOP_MMHG: 18
OD_IOP_MMHG: 18

## 2023-07-11 ASSESSMENT — CONFRONTATIONAL VISUAL FIELD TEST (CVF)
OD_FINDINGS: FULL
OS_FINDINGS: FULL

## 2023-07-11 ASSESSMENT — SPHEQUIV_DERIVED
OS_SPHEQUIV: 0.125
OD_SPHEQUIV: -0.75
OD_SPHEQUIV: -0.75
OS_SPHEQUIV: 0.125

## 2023-11-13 ENCOUNTER — RX RENEWAL (OUTPATIENT)
Age: 63
End: 2023-11-13

## 2023-11-20 ENCOUNTER — NON-APPOINTMENT (OUTPATIENT)
Age: 63
End: 2023-11-20

## 2023-11-27 ENCOUNTER — NON-APPOINTMENT (OUTPATIENT)
Age: 63
End: 2023-11-27

## 2023-11-28 ENCOUNTER — APPOINTMENT (OUTPATIENT)
Dept: INTERNAL MEDICINE | Facility: CLINIC | Age: 63
End: 2023-11-28
Payer: COMMERCIAL

## 2023-11-28 VITALS
SYSTOLIC BLOOD PRESSURE: 122 MMHG | DIASTOLIC BLOOD PRESSURE: 70 MMHG | HEIGHT: 67 IN | TEMPERATURE: 97.3 F | OXYGEN SATURATION: 99 % | HEART RATE: 84 BPM

## 2023-11-28 DIAGNOSIS — J34.89 OTHER SPECIFIED DISORDERS OF NOSE AND NASAL SINUSES: ICD-10-CM

## 2023-11-28 PROCEDURE — 99213 OFFICE O/P EST LOW 20 MIN: CPT

## 2023-12-19 ENCOUNTER — LABORATORY RESULT (OUTPATIENT)
Age: 63
End: 2023-12-19

## 2023-12-19 ENCOUNTER — APPOINTMENT (OUTPATIENT)
Dept: OBGYN | Facility: CLINIC | Age: 63
End: 2023-12-19
Payer: COMMERCIAL

## 2023-12-19 VITALS
BODY MASS INDEX: 19.78 KG/M2 | HEIGHT: 67 IN | DIASTOLIC BLOOD PRESSURE: 70 MMHG | SYSTOLIC BLOOD PRESSURE: 120 MMHG | WEIGHT: 126 LBS

## 2023-12-19 DIAGNOSIS — Z12.39 ENCOUNTER FOR OTHER SCREENING FOR MALIGNANT NEOPLASM OF BREAST: ICD-10-CM

## 2023-12-19 DIAGNOSIS — R92.30 INCONCLUSIVE MAMMOGRAM: ICD-10-CM

## 2023-12-19 DIAGNOSIS — Z91.89 OTHER SPECIFIED PERSONAL RISK FACTORS, NOT ELSEWHERE CLASSIFIED: ICD-10-CM

## 2023-12-19 DIAGNOSIS — Z01.419 ENCOUNTER FOR GYNECOLOGICAL EXAMINATION (GENERAL) (ROUTINE) W/OUT ABNORMAL FINDINGS: ICD-10-CM

## 2023-12-19 DIAGNOSIS — Z12.31 ENCOUNTER FOR SCREENING MAMMOGRAM FOR MALIGNANT NEOPLASM OF BREAST: ICD-10-CM

## 2023-12-19 DIAGNOSIS — R92.2 INCONCLUSIVE MAMMOGRAM: ICD-10-CM

## 2023-12-19 PROCEDURE — 99396 PREV VISIT EST AGE 40-64: CPT

## 2023-12-19 NOTE — HISTORY OF PRESENT ILLNESS
[FreeTextEntry1] : 62 y/o G0 presents for annual GYN exam.  , but not sexually active due to 's disability.  needs breast biopsy for breast mass.  History of breast biopsies. Strong family history of ovarian/breast/endometrial ca.  No PMB.  Health stable.  Mammogram/ultrasound 1/31/23 - BI-RADDS 2- extremely dense (Tyrer-Cuzick (RICHA) Life: 41.5%).  Last colonoscopy 2/18/20- Recall 7- 10 years  Last pap 3/18/21- NILM/HPV-  Osteopenia/Osteoporosis- Followed by Dr. Dupont. Had one dose of Reclast.  Works as an NP in pain management/headache specialty.

## 2023-12-19 NOTE — PLAN
[FreeTextEntry1] : Offered additional breast screening with MRI due to family risk/extremely dense breast tissue. Pt declined.  Referred to breast specialist for surveillance due to above risk factors.

## 2023-12-19 NOTE — PHYSICAL EXAM
[Chaperone Declined] : Patient declined chaperone [Appropriately responsive] : appropriately responsive [Alert] : alert [No Acute Distress] : no acute distress [No Lymphadenopathy] : no lymphadenopathy [Regular Rate Rhythm] : regular rate rhythm [No Murmurs] : no murmurs [Clear to Auscultation B/L] : clear to auscultation bilaterally [Soft] : soft [Non-tender] : non-tender [Non-distended] : non-distended [No HSM] : No HSM [No Lesions] : no lesions [No Mass] : no mass [Oriented x3] : oriented x3 [Examination Of The Breasts] : a normal appearance [No Discharge] : no discharge [No Masses] : no breast masses were palpable [Labia Majora] : normal [Labia Minora] : normal [Atrophy] : atrophy [Normal] : normal [Uterine Adnexae] : normal [Tenderness] : nontender [Enlarged ___ wks] : not enlarged

## 2024-01-23 ENCOUNTER — OFFICE (OUTPATIENT)
Dept: URBAN - METROPOLITAN AREA CLINIC 30 | Facility: CLINIC | Age: 64
Setting detail: OPHTHALMOLOGY
End: 2024-01-23
Payer: COMMERCIAL

## 2024-01-23 DIAGNOSIS — H52.4: ICD-10-CM

## 2024-01-23 DIAGNOSIS — H40.053: ICD-10-CM

## 2024-01-23 DIAGNOSIS — H25.093: ICD-10-CM

## 2024-01-23 PROCEDURE — 92020 GONIOSCOPY: CPT | Performed by: OPHTHALMOLOGY

## 2024-01-23 PROCEDURE — 92014 COMPRE OPH EXAM EST PT 1/>: CPT | Performed by: OPHTHALMOLOGY

## 2024-01-23 PROCEDURE — 92133 CPTRZD OPH DX IMG PST SGM ON: CPT | Performed by: OPHTHALMOLOGY

## 2024-01-23 PROCEDURE — 92015 DETERMINE REFRACTIVE STATE: CPT | Performed by: OPHTHALMOLOGY

## 2024-01-23 ASSESSMENT — REFRACTION_MANIFEST
OD_CYLINDER: +2.00
OS_ADD: +2.25
OD_VA1: 20/30-2
OS_AXIS: 09
OD_AXIS: 170
OS_SPHERE: -0.50
OD_ADD: +2.25
OD_SPHERE: -2.00
OD_SPHERE: +
OS_VA1: 20/25-1
OS_CYLINDER: +1.25

## 2024-01-23 ASSESSMENT — REFRACTION_AUTOREFRACTION
OD_CYLINDER: +2.00
OD_SPHERE: -1.75
OS_AXIS: 09
OD_AXIS: 167
OS_SPHERE: -0.50
OS_CYLINDER: +1.25

## 2024-01-23 ASSESSMENT — CONFRONTATIONAL VISUAL FIELD TEST (CVF)
OD_FINDINGS: FULL
OS_FINDINGS: FULL

## 2024-01-23 ASSESSMENT — SPHEQUIV_DERIVED
OD_SPHEQUIV: -0.75
OD_SPHEQUIV: -1
OS_SPHEQUIV: 0.125
OS_SPHEQUIV: 0.125

## 2024-01-23 ASSESSMENT — REFRACTION_CURRENTRX
OS_CYLINDER: +0.75
OS_OVR_VA: 20/
OD_SPHERE: +0.50
OD_AXIS: 171
OD_SPHERE: -1.75
OS_OVR_VA: 20/
OS_SPHERE: +1.75
OS_AXIS: 16
OD_CYLINDER: +1.75
OS_SPHERE: -0.50
OD_OVR_VA: 20/
OS_AXIS: 11
OS_CYLINDER: +0.75
OD_CYLINDER: +1.25
OD_AXIS: 171
OD_OVR_VA: 20/

## 2024-01-25 ENCOUNTER — APPOINTMENT (OUTPATIENT)
Dept: ENDOCRINOLOGY | Facility: CLINIC | Age: 64
End: 2024-01-25
Payer: COMMERCIAL

## 2024-01-25 VITALS
BODY MASS INDEX: 20.23 KG/M2 | WEIGHT: 127.4 LBS | SYSTOLIC BLOOD PRESSURE: 122 MMHG | OXYGEN SATURATION: 99 % | HEART RATE: 69 BPM | DIASTOLIC BLOOD PRESSURE: 70 MMHG | HEIGHT: 66.5 IN

## 2024-01-25 DIAGNOSIS — E04.2 NONTOXIC MULTINODULAR GOITER: ICD-10-CM

## 2024-01-25 DIAGNOSIS — M81.0 AGE-RELATED OSTEOPOROSIS W/OUT CURRENT PATHOLOGICAL FRACTURE: ICD-10-CM

## 2024-01-25 DIAGNOSIS — E03.9 HYPOTHYROIDISM, UNSPECIFIED: ICD-10-CM

## 2024-01-25 DIAGNOSIS — D80.1 NONFAMILIAL HYPOGAMMAGLOBULINEMIA: ICD-10-CM

## 2024-01-25 DIAGNOSIS — E55.9 VITAMIN D DEFICIENCY, UNSPECIFIED: ICD-10-CM

## 2024-01-25 PROCEDURE — 99215 OFFICE O/P EST HI 40 MIN: CPT

## 2024-01-25 RX ORDER — CYANOCOBALAMIN (VITAMIN B-12) 1000 MCG
1000 TABLET ORAL
Qty: 30 | Refills: 2 | Status: ACTIVE | COMMUNITY
Start: 2021-03-09

## 2024-01-25 RX ORDER — CHOLECALCIFEROL (VITAMIN D3) 10(400)/ML
DROPS ORAL
Refills: 0 | Status: ACTIVE | COMMUNITY
Start: 2021-03-09

## 2024-01-25 RX ORDER — PSYLLIUM HUSK 0.4 G
CAPSULE ORAL
Refills: 0 | Status: ACTIVE | COMMUNITY
Start: 2021-03-09

## 2024-01-25 RX ORDER — NIRMATRELVIR AND RITONAVIR 300-100 MG
20 X 150 MG & KIT ORAL
Qty: 30 | Refills: 0 | Status: DISCONTINUED | COMMUNITY
Start: 2023-02-01 | End: 2024-01-25

## 2024-01-31 NOTE — HISTORY OF PRESENT ILLNESS
[FreeTextEntry1] : Last Reclast 3/10/22 done once, per pt we cancelled her juan daneil so not done last year tolerated well  Ms. MARIBEL BERGMAN is a 64 year old female initially  sent in a consult by her PCP for newly diagnosed thyroid nodules after having MRI C spine for neck pains with her neurologist  today for f/u  she brought in a copy of her US reviewed  had nondiagnostic FNA 8/21 , repeated US 1/22 stable nodules reviewed recent pelvic Fx  denies FHx thyroid cancer, mother and sister breast cancer, maternal sister ovarian cancer maternal aunt liver cancer denies h/o neck irradiation denies dysphagia denies dyspnea denies dysphonia  TSH 3.8 2/2021 1/25/24 follow up for osteo and hypothyroid-  Last Reclast #1 was 3/2022. Tolerated it well. She is only taking vitamin D twice a week because levels were high last time.  She has been feeling well.  Had another foot injury in January 2023 w/ bruising, possible fracture. Also had COVID. Never got a chance to get MRI done because of this. She also still needs to get a repeat thyroid ultrasound.

## 2024-02-06 ENCOUNTER — APPOINTMENT (OUTPATIENT)
Dept: INTERNAL MEDICINE | Facility: CLINIC | Age: 64
End: 2024-02-06
Payer: MEDICARE

## 2024-02-06 VITALS
SYSTOLIC BLOOD PRESSURE: 132 MMHG | HEIGHT: 67 IN | BODY MASS INDEX: 20.4 KG/M2 | TEMPERATURE: 98.7 F | OXYGEN SATURATION: 98 % | WEIGHT: 130 LBS | DIASTOLIC BLOOD PRESSURE: 78 MMHG | HEART RATE: 68 BPM | RESPIRATION RATE: 16 BRPM

## 2024-02-06 DIAGNOSIS — Z00.00 ENCOUNTER FOR GENERAL ADULT MEDICAL EXAMINATION W/OUT ABNORMAL FINDINGS: ICD-10-CM

## 2024-02-06 PROCEDURE — 36415 COLL VENOUS BLD VENIPUNCTURE: CPT

## 2024-02-06 PROCEDURE — 99397 PER PM REEVAL EST PAT 65+ YR: CPT

## 2024-02-06 PROCEDURE — G0444 DEPRESSION SCREEN ANNUAL: CPT | Mod: 59

## 2024-02-07 NOTE — COUNSELING
[Fall prevention counseling provided] : Fall prevention counseling provided [Adequate lighting] : Adequate lighting [Behavioral health counseling provided] : Behavioral health counseling provided [Sleep ___ hours/day] : Sleep [unfilled] hours/day [Engage in a relaxing activity] : Engage in a relaxing activity

## 2024-02-07 NOTE — HEALTH RISK ASSESSMENT
[Very Good] : ~his/her~  mood as very good [No] : No [No falls in past year] : Patient reported no falls in the past year [0] : 2) Feeling down, depressed, or hopeless: Not at all (0) [PHQ-2 Negative - No further assessment needed] : PHQ-2 Negative - No further assessment needed [Patient reported bone density results were abnormal] : Patient reported bone density results were abnormal [Patient reported colonoscopy was normal] : Patient reported colonoscopy was normal [Fully functional (bathing, dressing, toileting, transferring, walking, feeding)] : Fully functional (bathing, dressing, toileting, transferring, walking, feeding) [Fully functional (using the telephone, shopping, preparing meals, housekeeping, doing laundry, using] : Fully functional and needs no help or supervision to perform IADLs (using the telephone, shopping, preparing meals, housekeeping, doing laundry, using transportation, managing medications and managing finances) [Never] : Never [Patient reported mammogram was normal] : Patient reported mammogram was normal [Smoke Detector] : smoke detector [Safety elements used in home] : safety elements used in home [Seat Belt] :  uses seat belt [Sunscreen] : uses sunscreen [[id number]] : 0 [MammogramDate] : 01/22 [PapSmearDate] : 08/22 [BoneDensityDate] : 06/23 [ColonoscopyDate] : 02/20

## 2024-02-07 NOTE — HISTORY OF PRESENT ILLNESS
[FreeTextEntry1] : annual exam  [de-identified] : 1. annual exam  2. currently feels fine 3. followed endocrine = for osteoporosis  4. behind in mamogram   Patient does follow OB/GYN.  Patient reports good energy.  Patient reports good mental health.

## 2024-02-09 ENCOUNTER — TRANSCRIPTION ENCOUNTER (OUTPATIENT)
Age: 64
End: 2024-02-09

## 2024-02-09 LAB
25(OH)D3 SERPL-MCNC: 30.6 NG/ML
ALBUMIN SERPL ELPH-MCNC: 3.7 G/DL
ALP BLD-CCNC: 42 U/L
ALT SERPL-CCNC: 15 U/L
ANION GAP SERPL CALC-SCNC: 9 MMOL/L
APPEARANCE: CLEAR
AST SERPL-CCNC: 27 U/L
BASOPHILS # BLD AUTO: 0.02 K/UL
BASOPHILS NFR BLD AUTO: 0.3 %
BILIRUB SERPL-MCNC: 0.4 MG/DL
BILIRUBIN URINE: NEGATIVE
BLOOD URINE: NEGATIVE
BUN SERPL-MCNC: 18 MG/DL
CALCIUM SERPL-MCNC: 9.6 MG/DL
CHLORIDE SERPL-SCNC: 109 MMOL/L
CHOLEST SERPL-MCNC: 153 MG/DL
CO2 SERPL-SCNC: 23 MMOL/L
COLOR: YELLOW
CREAT SERPL-MCNC: 0.79 MG/DL
EGFR: 83 ML/MIN/1.73M2
EOSINOPHIL # BLD AUTO: 0.06 K/UL
EOSINOPHIL NFR BLD AUTO: 0.8 %
ESTIMATED AVERAGE GLUCOSE: 103 MG/DL
GLUCOSE QUALITATIVE U: NEGATIVE MG/DL
GLUCOSE SERPL-MCNC: 90 MG/DL
HBA1C MFR BLD HPLC: 5.2 %
HCT VFR BLD CALC: 46.4 %
HDLC SERPL-MCNC: 74 MG/DL
HGB BLD-MCNC: 15 G/DL
IMM GRANULOCYTES NFR BLD AUTO: 0.5 %
KETONES URINE: NEGATIVE MG/DL
LDLC SERPL CALC-MCNC: 67 MG/DL
LEUKOCYTE ESTERASE URINE: NEGATIVE
LYMPHOCYTES # BLD AUTO: 0.99 K/UL
LYMPHOCYTES NFR BLD AUTO: 13.6 %
MAN DIFF?: NORMAL
MCHC RBC-ENTMCNC: 31.8 PG
MCHC RBC-ENTMCNC: 32.3 GM/DL
MCV RBC AUTO: 98.3 FL
MONOCYTES # BLD AUTO: 0.47 K/UL
MONOCYTES NFR BLD AUTO: 6.4 %
NEUTROPHILS # BLD AUTO: 5.72 K/UL
NEUTROPHILS NFR BLD AUTO: 78.4 %
NITRITE URINE: NEGATIVE
NONHDLC SERPL-MCNC: 79 MG/DL
PH URINE: 6.5
PLATELET # BLD AUTO: 301 K/UL
POTASSIUM SERPL-SCNC: 4.2 MMOL/L
PROT SERPL-MCNC: 5.3 G/DL
PROTEIN URINE: NEGATIVE MG/DL
RBC # BLD: 4.72 M/UL
RBC # FLD: 13.5 %
SODIUM SERPL-SCNC: 142 MMOL/L
SPECIFIC GRAVITY URINE: 1.01
T4 SERPL-MCNC: 7.1 UG/DL
TRIGL SERPL-MCNC: 54 MG/DL
TSH SERPL-ACNC: 1.56 UIU/ML
UROBILINOGEN URINE: 0.2 MG/DL
VIT B12 SERPL-MCNC: 564 PG/ML
WBC # FLD AUTO: 7.3 K/UL

## 2024-03-01 ENCOUNTER — NON-APPOINTMENT (OUTPATIENT)
Age: 64
End: 2024-03-01

## 2024-04-17 ENCOUNTER — APPOINTMENT (OUTPATIENT)
Dept: BREAST CENTER | Facility: CLINIC | Age: 64
End: 2024-04-17

## 2024-05-20 ENCOUNTER — RX RENEWAL (OUTPATIENT)
Age: 64
End: 2024-05-20

## 2024-05-20 RX ORDER — LEVOTHYROXINE SODIUM 25 UG/1
25 TABLET ORAL
Qty: 90 | Refills: 1 | Status: ACTIVE | COMMUNITY
Start: 2021-10-05 | End: 1900-01-01

## 2024-05-25 ENCOUNTER — RESULT REVIEW (OUTPATIENT)
Age: 64
End: 2024-05-25

## 2024-07-24 NOTE — HISTORY OF PRESENT ILLNESS
[de-identified] : Ms. 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  She was first told she had abnormal blood work about 3 years ago, was tested for hemochromatosis about 1 year ago-reports being negative.\par  She denies any illness, fevers.\par  \par  \par  Age of Menarche: 13\par  Age of Menopause: 56\par  OCP/HRT: OCP x 2 years\par  \par  \par  Smoke: never\par  ETOH: rare\par  Illicit: never\par  \par  Family History\par  Sister (younger) Breast Ca dx age 58, BRCA negative\par  Sister (older) Breast Ca dx late 50's (), BRCA negative\par  Maternal Uncle Liver Ca dx 70's\par  Maternal Aunt Uterine Ca dx 50's\par  Mother Breast and Uterine dx age mid 60's/ late 50's\par  Brother Prostate Ca dx mid 50's\par  Father Basal Cell?\par  Nephew NHL dx early 30's\par  \par  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 Covid diagnosis a few months ago but no respiratory or sinus symptoms

## 2024-07-31 ENCOUNTER — APPOINTMENT (OUTPATIENT)
Dept: HEMATOLOGY ONCOLOGY | Facility: CLINIC | Age: 64
End: 2024-07-31

## 2024-07-31 ENCOUNTER — RESULT REVIEW (OUTPATIENT)
Age: 64
End: 2024-07-31

## 2024-07-31 ENCOUNTER — APPOINTMENT (OUTPATIENT)
Dept: HEMATOLOGY ONCOLOGY | Facility: CLINIC | Age: 64
End: 2024-07-31
Payer: MEDICARE

## 2024-07-31 VITALS
BODY MASS INDEX: 17.91 KG/M2 | TEMPERATURE: 97.6 F | OXYGEN SATURATION: 100 % | SYSTOLIC BLOOD PRESSURE: 134 MMHG | DIASTOLIC BLOOD PRESSURE: 84 MMHG | WEIGHT: 114.13 LBS | HEART RATE: 77 BPM | RESPIRATION RATE: 16 BRPM | HEIGHT: 67 IN

## 2024-07-31 DIAGNOSIS — D80.1 NONFAMILIAL HYPOGAMMAGLOBULINEMIA: ICD-10-CM

## 2024-07-31 PROCEDURE — 99213 OFFICE O/P EST LOW 20 MIN: CPT

## 2024-07-31 NOTE — ASSESSMENT
[FreeTextEntry1] : #hypogammaglobulinemia Reviewed the diagnosis of hypogammaglobulinemia LISA - no monoclonal band,   immunoglobulins - kappa slightly elevated but no monoclonal band  UPEP with LISA - no monoclonal band IgG subsets - low IgG1 and 2 subsets - asymptomatic. No Frequent infections. Will monitor. If she develops infection will give IVIG Flow cytometry - normal remains hypogammma, without frequent fevers or sinusitis.   #iron deficiency anemia-  hgb wnl, cbc and cmp reviewed irons wnl ferritin pending  #thyroid nodules with hypothyroidism initial biopsy with Dr. Smith inconclusive taking synthroid 0.25 mcg. Dr. Dupont monitoring  biopsy - benign   #osteopenia - continue ca++ and vit D follows with Dr. Dupont DEXA 5/24 - normal bone density on reclast with Dr. Dupont  RTC PRN [AdvancecareDate] : 07/31/24

## 2024-07-31 NOTE — HISTORY OF PRESENT ILLNESS
[de-identified] : Ms. 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  She was first told she had abnormal blood work about 3 years ago, was tested for hemochromatosis about 1 year ago-reports being negative.\par  She denies any illness, fevers.\par  \par  \par  Age of Menarche: 13\par  Age of Menopause: 56\par  OCP/HRT: OCP x 2 years\par  \par  \par  Smoke: never\par  ETOH: rare\par  Illicit: never\par  \par  Family History\par  Sister (younger) Breast Ca dx age 58, BRCA negative\par  Sister (older) Breast Ca dx late 50's (), BRCA negative\par  Maternal Uncle Liver Ca dx 70's\par  Maternal Aunt Uterine Ca dx 50's\par  Mother Breast and Uterine dx age mid 60's/ late 50's\par  Brother Prostate Ca dx mid 50's\par  Father Basal Cell?\par  Nephew NHL dx early 30's\par  \par  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, [place] location of patient: car consent provided by patient She reports feeling well.  had sinus infection in feb basal cell carcinoma - needs Moh's surgery continues on reclast - sees Dr. Dupont

## 2024-07-31 NOTE — ASSESSMENT
[FreeTextEntry1] : #hypogammaglobulinemia\par  Reviewed the diagnosis of hypogammaglobulinemia\par  LISA - no monoclonal band,  \par  immunoglobulins - kappa slightly elevated but no monoclonal band \par  UPEP with LISA - no monoclonal band\par  IgG subsets - low IgG1 and 2 subsets - asymptomatic. No Frequent infections. Will monitor. If she develops infection will give IVIG\par  Flow cytometry - normal\par  remains hypogammma, without frequent fevers or sinusitis. \par  \par  #iron deficiency anemia- \par  ferritin 69\par  \par  #thyroid nodules with hypothyroidism\par  initial biopsy with Dr. Smith inconclusive\par  taking synthroid 0.25 mcg.\par  Dr. Dupont monitoring \par  biopsy - benign \par  \par  #osteopenia - continue ca++ and vit D\par  follows with Dr. Dupont\par  \par  RTC in 6 months with cbc with diff, cmp, immunoglobulins, B12/folate, iron panel and ferritin [Reviewed updated] : Reviewed updated [Designated Health Care Proxy] : Designated Health Care Proxy [Name: ___] : Name: [unfilled] [Relationship: ___] : Relationship: [unfilled] [Full Code] : full code

## 2024-07-31 NOTE — REVIEW OF SYSTEMS
[Fatigue] : no fatigue [Constipation] : no constipation [Joint Pain] : no joint pain [FreeTextEntry2] : review of systems completed, negative unless otherwise noted above [FreeTextEntry7] : chronic [Diarrhea: Grade 0] : Diarrhea: Grade 0 [Anxiety] : anxiety [Negative] : Gastrointestinal no transient paralysis/no generalized seizures/no syncope

## 2024-08-20 ENCOUNTER — APPOINTMENT (OUTPATIENT)
Dept: INTERNAL MEDICINE | Facility: CLINIC | Age: 64
End: 2024-08-20
Payer: MEDICARE

## 2024-08-20 VITALS — DIASTOLIC BLOOD PRESSURE: 72 MMHG | SYSTOLIC BLOOD PRESSURE: 124 MMHG

## 2024-08-20 VITALS
WEIGHT: 115 LBS | HEART RATE: 65 BPM | BODY MASS INDEX: 18.05 KG/M2 | DIASTOLIC BLOOD PRESSURE: 80 MMHG | TEMPERATURE: 98 F | OXYGEN SATURATION: 97 % | SYSTOLIC BLOOD PRESSURE: 140 MMHG | HEIGHT: 67 IN

## 2024-08-20 DIAGNOSIS — J06.9 ACUTE UPPER RESPIRATORY INFECTION, UNSPECIFIED: ICD-10-CM

## 2024-08-20 PROCEDURE — G0444 DEPRESSION SCREEN ANNUAL: CPT | Mod: 59

## 2024-08-20 PROCEDURE — 99203 OFFICE O/P NEW LOW 30 MIN: CPT

## 2024-08-20 NOTE — PHYSICAL EXAM
[No Acute Distress] : no acute distress [Normal Voice/Communication] : normal voice/communication [No Lymphadenopathy] : no lymphadenopathy [Normal] : normal rate, regular rhythm, normal S1 and S2 and no murmur heard [de-identified] : Turbinates not enlarged with negative exudate.  Left maxillary tenderness.

## 2024-08-20 NOTE — REVIEW OF SYSTEMS
[Postnasal Drip] : postnasal drip [Cough] : cough [Headache] : headache [Fever] : no fever [Chills] : no chills [Nasal Discharge] : no nasal discharge [Sore Throat] : no sore throat [Chest Pain] : no chest pain [Shortness Of Breath] : no shortness of breath [Wheezing] : no wheezing [Abdominal Pain] : no abdominal pain [Nausea] : no nausea [Diarrhea] : diarrhea [Vomiting] : no vomiting

## 2024-08-20 NOTE — HISTORY OF PRESENT ILLNESS
[FreeTextEntry8] : Sixth day cough, rhinitis, myalgias, No fever. SOB, GI sx. Takes tylenol and maegan seltzer with some relief. Two days ago felt better but today has right face pressure. Still no fever, HA, Mucus clear and yellow and unsure if this is changed.

## 2024-08-20 NOTE — HEALTH RISK ASSESSMENT
[No] : In the past 12 months have you used drugs other than those required for medical reasons? No [0] : 2) Feeling down, depressed, or hopeless: Not at all (0) [PHQ-2 Negative - No further assessment needed] : PHQ-2 Negative - No further assessment needed [Never] : Never [Audit-CScore] : 0 [[id number]] : 0

## 2024-08-21 ENCOUNTER — TRANSCRIPTION ENCOUNTER (OUTPATIENT)
Age: 64
End: 2024-08-21

## 2024-08-21 LAB
INFLUENZA A RESULT: NOT DETECTED
INFLUENZA B RESULT: NOT DETECTED
RESP SYN VIRUS RESULT: NOT DETECTED
SARS-COV-2 RESULT: DETECTED

## 2024-11-11 DIAGNOSIS — M81.0 AGE-RELATED OSTEOPOROSIS W/OUT CURRENT PATHOLOGICAL FRACTURE: ICD-10-CM

## 2024-11-18 ENCOUNTER — RX RENEWAL (OUTPATIENT)
Age: 64
End: 2024-11-18

## 2024-11-18 LAB
24R-OH-CALCIDIOL SERPL-MCNC: 41.2 PG/ML
25(OH)D3 SERPL-MCNC: 34.3 NG/ML
ALBUMIN SERPL ELPH-MCNC: 3.4 G/DL
ALP BLD-CCNC: 50 U/L
ALT SERPL-CCNC: 26 U/L
ANION GAP SERPL CALC-SCNC: 11 MMOL/L
AST SERPL-CCNC: 36 U/L
BILIRUB SERPL-MCNC: 0.4 MG/DL
BUN SERPL-MCNC: 21 MG/DL
CALCIUM SERPL-MCNC: 9.9 MG/DL
CALCIUM SERPL-MCNC: 9.9 MG/DL
CHLORIDE SERPL-SCNC: 107 MMOL/L
CO2 SERPL-SCNC: 25 MMOL/L
CREAT SERPL-MCNC: 0.92 MG/DL
EGFR: 69 ML/MIN/1.73M2
GLUCOSE SERPL-MCNC: 91 MG/DL
MAGNESIUM SERPL-MCNC: 2.2 MG/DL
PARATHYROID HORMONE INTACT: 37 PG/ML
PHOSPHATE SERPL-MCNC: 4.4 MG/DL
POTASSIUM SERPL-SCNC: 4.2 MMOL/L
PROT SERPL-MCNC: 4.9 G/DL
SODIUM SERPL-SCNC: 142 MMOL/L
T4 FREE SERPL-MCNC: 1.1 NG/DL
TSH SERPL-ACNC: 2.24 UIU/ML

## 2024-11-19 LAB — COLLAGEN CTX SERPL-MCNC: 174 PG/ML

## 2024-11-26 ENCOUNTER — APPOINTMENT (OUTPATIENT)
Dept: ENDOCRINOLOGY | Facility: CLINIC | Age: 64
End: 2024-11-26
Payer: MEDICARE

## 2024-11-26 VITALS
HEART RATE: 71 BPM | BODY MASS INDEX: 18.11 KG/M2 | HEIGHT: 66.5 IN | OXYGEN SATURATION: 99 % | SYSTOLIC BLOOD PRESSURE: 122 MMHG | WEIGHT: 114 LBS | DIASTOLIC BLOOD PRESSURE: 82 MMHG

## 2024-11-26 DIAGNOSIS — E03.9 HYPOTHYROIDISM, UNSPECIFIED: ICD-10-CM

## 2024-11-26 DIAGNOSIS — D80.1 NONFAMILIAL HYPOGAMMAGLOBULINEMIA: ICD-10-CM

## 2024-11-26 DIAGNOSIS — E04.2 NONTOXIC MULTINODULAR GOITER: ICD-10-CM

## 2024-11-26 DIAGNOSIS — M81.0 AGE-RELATED OSTEOPOROSIS W/OUT CURRENT PATHOLOGICAL FRACTURE: ICD-10-CM

## 2024-11-26 DIAGNOSIS — E55.9 VITAMIN D DEFICIENCY, UNSPECIFIED: ICD-10-CM

## 2024-11-26 PROCEDURE — G2211 COMPLEX E/M VISIT ADD ON: CPT

## 2024-11-26 PROCEDURE — 99205 OFFICE O/P NEW HI 60 MIN: CPT
